# Patient Record
Sex: MALE | Race: BLACK OR AFRICAN AMERICAN | NOT HISPANIC OR LATINO | Employment: UNEMPLOYED | URBAN - METROPOLITAN AREA
[De-identification: names, ages, dates, MRNs, and addresses within clinical notes are randomized per-mention and may not be internally consistent; named-entity substitution may affect disease eponyms.]

---

## 2017-06-19 ENCOUNTER — OFFICE VISIT (OUTPATIENT)
Dept: INTERNAL MEDICINE | Facility: CLINIC | Age: 57
End: 2017-06-19
Attending: FAMILY MEDICINE
Payer: MEDICAID

## 2017-06-19 VITALS
WEIGHT: 191.81 LBS | BODY MASS INDEX: 31.96 KG/M2 | HEIGHT: 65 IN | DIASTOLIC BLOOD PRESSURE: 82 MMHG | SYSTOLIC BLOOD PRESSURE: 126 MMHG | HEART RATE: 76 BPM | OXYGEN SATURATION: 96 % | TEMPERATURE: 98 F

## 2017-06-19 DIAGNOSIS — C67.9 MALIGNANT NEOPLASM OF URINARY BLADDER, UNSPECIFIED SITE: ICD-10-CM

## 2017-06-19 DIAGNOSIS — Z00.00 ROUTINE GENERAL MEDICAL EXAMINATION AT A HEALTH CARE FACILITY: Primary | ICD-10-CM

## 2017-06-19 PROCEDURE — 99999 PR PBB SHADOW E&M-NEW PATIENT-LVL III: CPT | Mod: PBBFAC,,, | Performed by: INTERNAL MEDICINE

## 2017-06-19 PROCEDURE — 99386 PREV VISIT NEW AGE 40-64: CPT | Mod: S$PBB,,, | Performed by: INTERNAL MEDICINE

## 2017-06-19 PROCEDURE — 99203 OFFICE O/P NEW LOW 30 MIN: CPT | Mod: PBBFAC,PO | Performed by: INTERNAL MEDICINE

## 2017-06-19 NOTE — PROGRESS NOTES
Subjective:       Patient ID: Nolan Villalobos is a 56 y.o. male.    Chief Complaint: Establish Care    Here for checkup.    Has not had routine care.          Review of Systems   Constitutional: Negative for appetite change and unexpected weight change.   Respiratory: Negative for chest tightness and shortness of breath.    Cardiovascular: Negative for chest pain.   Gastrointestinal: Negative for abdominal pain.   Genitourinary: Negative for difficulty urinating, hematuria, scrotal swelling and testicular pain.   Skin:        No lesions       Objective:      Physical Exam   Constitutional: He is oriented to person, place, and time. He appears well-developed and well-nourished. No distress.   HENT:   Head: Normocephalic and atraumatic.   Eyes: No scleral icterus.   Neck: Normal range of motion. No thyromegaly present.   Cardiovascular: Normal rate, regular rhythm and normal heart sounds.  Exam reveals no gallop and no friction rub.    No murmur heard.  Pulmonary/Chest: Effort normal and breath sounds normal. No respiratory distress. He has no wheezes. He has no rales.   Abdominal: Soft. Bowel sounds are normal. He exhibits no distension and no mass. There is no tenderness. There is no rebound and no guarding.   Musculoskeletal: Normal range of motion. He exhibits no edema.   Lymphadenopathy:     He has no cervical adenopathy.   Neurological: He is alert and oriented to person, place, and time.   Skin: No lesion noted.   Psychiatric: He has a normal mood and affect. Thought content normal.       Assessment:       1. Routine general medical examination at a health care facility    2. Malignant neoplasm of urinary bladder, unspecified site        Plan:       Nolan was seen today for establish care.    Diagnoses and all orders for this visit:    Routine general medical examination at a health care facility  -     CBC auto differential; Future  -     Comprehensive metabolic panel; Future  -     Hepatitis C antibody; Future  -      Lipid panel; Future  -     Hemoglobin A1c; Future  -     PSA, Screening; Future  -     Ambulatory Referral to [facility] (MED[facility])  -     [facility] ASSIGN QUESTIONNAIRE SERIES ([facility])  -     [facility] Patient Entered [facility] ([facility])    Malignant neoplasm of urinary bladder, unspecified site  -     CBC auto differential; Future  -     Comprehensive metabolic panel; Future  -     Hepatitis C antibody; Future  -     Lipid panel; Future  -     Hemoglobin A1c; Future  -     PSA, Screening; Future  -     Ambulatory Referral to [facility] ([facility])  -     [facility] [employer] ASSIGN QUESTIONNAIRE SERIES ([facility])  -     [facility] Patient Entered [facility] ([facility])    utd colo had in 2012 at ? [facility]    Return in about 1 year (around 6/19/2018).

## 2017-06-26 ENCOUNTER — LAB VISIT (OUTPATIENT)
Dept: LAB | Facility: HOSPITAL | Age: 57
End: 2017-06-26
Attending: INTERNAL MEDICINE
Payer: MEDICAID

## 2017-06-26 DIAGNOSIS — C67.9 MALIGNANT NEOPLASM OF URINARY BLADDER, UNSPECIFIED SITE: ICD-10-CM

## 2017-06-26 DIAGNOSIS — Z00.00 ROUTINE GENERAL MEDICAL EXAMINATION AT A HEALTH CARE FACILITY: ICD-10-CM

## 2017-06-26 LAB
ALBUMIN SERPL BCP-MCNC: 3.6 G/DL
ALP SERPL-CCNC: 63 U/L
ALT SERPL W/O P-5'-P-CCNC: 13 U/L
ANION GAP SERPL CALC-SCNC: 7 MMOL/L
AST SERPL-CCNC: 17 U/L
BASOPHILS # BLD AUTO: 0.04 K/UL
BASOPHILS NFR BLD: 0.6 %
BILIRUB SERPL-MCNC: 0.3 MG/DL
BUN SERPL-MCNC: 10 MG/DL
CALCIUM SERPL-MCNC: 9.2 MG/DL
CHLORIDE SERPL-SCNC: 104 MMOL/L
CHOLEST/HDLC SERPL: 4.5 {RATIO}
CO2 SERPL-SCNC: 29 MMOL/L
COMPLEXED PSA SERPL-MCNC: 0.86 NG/ML
CREAT SERPL-MCNC: 0.8 MG/DL
DIFFERENTIAL METHOD: ABNORMAL
EOSINOPHIL # BLD AUTO: 0.2 K/UL
EOSINOPHIL NFR BLD: 2.6 %
ERYTHROCYTE [DISTWIDTH] IN BLOOD BY AUTOMATED COUNT: 14.1 %
EST. GFR  (AFRICAN AMERICAN): >60 ML/MIN/1.73 M^2
EST. GFR  (NON AFRICAN AMERICAN): >60 ML/MIN/1.73 M^2
ESTIMATED AVG GLUCOSE: 100 MG/DL
GLUCOSE SERPL-MCNC: 92 MG/DL
HBA1C MFR BLD HPLC: 5.1 %
HCT VFR BLD AUTO: 43.4 %
HCV AB SERPL QL IA: NEGATIVE
HDL/CHOLESTEROL RATIO: 22.1 %
HDLC SERPL-MCNC: 204 MG/DL
HDLC SERPL-MCNC: 45 MG/DL
HGB BLD-MCNC: 14.3 G/DL
LDLC SERPL CALC-MCNC: 133.2 MG/DL
LYMPHOCYTES # BLD AUTO: 2 K/UL
LYMPHOCYTES NFR BLD: 29.8 %
MCH RBC QN AUTO: 26.5 PG
MCHC RBC AUTO-ENTMCNC: 32.9 %
MCV RBC AUTO: 81 FL
MONOCYTES # BLD AUTO: 0.8 K/UL
MONOCYTES NFR BLD: 12.7 %
NEUTROPHILS # BLD AUTO: 3.5 K/UL
NEUTROPHILS NFR BLD: 54 %
NONHDLC SERPL-MCNC: 159 MG/DL
PLATELET # BLD AUTO: 268 K/UL
PMV BLD AUTO: 10.9 FL
POTASSIUM SERPL-SCNC: 4 MMOL/L
PROT SERPL-MCNC: 7.5 G/DL
RBC # BLD AUTO: 5.39 M/UL
SODIUM SERPL-SCNC: 140 MMOL/L
TRIGL SERPL-MCNC: 129 MG/DL
WBC # BLD AUTO: 6.55 K/UL

## 2017-06-26 PROCEDURE — 85025 COMPLETE CBC W/AUTO DIFF WBC: CPT

## 2017-06-26 PROCEDURE — 83036 HEMOGLOBIN GLYCOSYLATED A1C: CPT

## 2017-06-26 PROCEDURE — 86803 HEPATITIS C AB TEST: CPT

## 2017-06-26 PROCEDURE — 84153 ASSAY OF PSA TOTAL: CPT

## 2017-06-26 PROCEDURE — 36415 COLL VENOUS BLD VENIPUNCTURE: CPT

## 2017-06-26 PROCEDURE — 80053 COMPREHEN METABOLIC PANEL: CPT

## 2017-06-26 PROCEDURE — 80061 LIPID PANEL: CPT

## 2018-01-16 ENCOUNTER — OFFICE VISIT (OUTPATIENT)
Dept: URGENT CARE | Facility: CLINIC | Age: 58
End: 2018-01-16
Payer: MEDICAID

## 2018-01-16 VITALS
SYSTOLIC BLOOD PRESSURE: 151 MMHG | OXYGEN SATURATION: 96 % | TEMPERATURE: 99 F | HEIGHT: 66 IN | HEART RATE: 88 BPM | BODY MASS INDEX: 28.93 KG/M2 | DIASTOLIC BLOOD PRESSURE: 104 MMHG | WEIGHT: 180 LBS | RESPIRATION RATE: 18 BRPM

## 2018-01-16 DIAGNOSIS — R05.9 COUGH: ICD-10-CM

## 2018-01-16 DIAGNOSIS — J01.90 ACUTE SINUSITIS, RECURRENCE NOT SPECIFIED, UNSPECIFIED LOCATION: Primary | ICD-10-CM

## 2018-01-16 LAB
CTP QC/QA: YES
CTP QC/QA: YES
FLUAV AG NPH QL: NEGATIVE
FLUBV AG NPH QL: NEGATIVE
S PYO RRNA THROAT QL PROBE: NEGATIVE

## 2018-01-16 PROCEDURE — 87804 INFLUENZA ASSAY W/OPTIC: CPT | Mod: QW,S$GLB,, | Performed by: NURSE PRACTITIONER

## 2018-01-16 PROCEDURE — 99213 OFFICE O/P EST LOW 20 MIN: CPT | Mod: S$GLB,,, | Performed by: NURSE PRACTITIONER

## 2018-01-16 PROCEDURE — 87880 STREP A ASSAY W/OPTIC: CPT | Mod: QW,S$GLB,, | Performed by: NURSE PRACTITIONER

## 2018-01-16 RX ORDER — AMOXICILLIN 500 MG/1
500 CAPSULE ORAL EVERY 12 HOURS
Qty: 20 CAPSULE | Refills: 0 | Status: SHIPPED | OUTPATIENT
Start: 2018-01-16 | End: 2018-01-26

## 2018-01-16 RX ORDER — BETAMETHASONE SODIUM PHOSPHATE AND BETAMETHASONE ACETATE 3; 3 MG/ML; MG/ML
9 INJECTION, SUSPENSION INTRA-ARTICULAR; INTRALESIONAL; INTRAMUSCULAR; SOFT TISSUE
Status: COMPLETED | OUTPATIENT
Start: 2018-01-16 | End: 2018-01-16

## 2018-01-16 RX ADMIN — BETAMETHASONE SODIUM PHOSPHATE AND BETAMETHASONE ACETATE 9 MG: 3; 3 INJECTION, SUSPENSION INTRA-ARTICULAR; INTRALESIONAL; INTRAMUSCULAR; SOFT TISSUE at 12:01

## 2018-01-16 NOTE — PROGRESS NOTES
"Subjective:       Patient ID: Nolan Villalobos is a 57 y.o. male.    Vitals:  height is 5' 6" (1.676 m) and weight is 81.6 kg (180 lb). His tympanic temperature is 98.8 °F (37.1 °C). His blood pressure is 151/104 (abnormal) and his pulse is 88. His respiration is 18 and oxygen saturation is 96%.     Chief Complaint: Cough (2 days )    This is a 57 y.o. male with Past Medical History:  No date: Bladder cancer   who presents today with a chief complaint of cough for 3-7 days.       Cough   This is a new problem. The current episode started in the past 7 days (3 days to 1 week ). The problem has been gradually worsening. The problem occurs constantly. The cough is productive of sputum. Associated symptoms include chills, eye redness (right ), headaches, myalgias, nasal congestion, rhinorrhea and a sore throat. Pertinent negatives include no chest pain, ear pain, fever, shortness of breath or wheezing. The symptoms are aggravated by lying down. Treatments tried: mucinex theraflu nyquil  The treatment provided mild relief.     Review of Systems   Constitution: Positive for chills. Negative for fever and malaise/fatigue.   HENT: Positive for congestion, hoarse voice, rhinorrhea and sore throat. Negative for ear pain.    Eyes: Positive for redness (right ). Negative for discharge. Eye pain: right    Cardiovascular: Negative for chest pain, dyspnea on exertion and leg swelling.   Respiratory: Positive for cough and sputum production. Negative for shortness of breath and wheezing.    Musculoskeletal: Positive for myalgias.   Gastrointestinal: Negative for abdominal pain and nausea.   Neurological: Positive for headaches.       Objective:      Physical Exam   Constitutional: He is oriented to person, place, and time. He appears well-developed and well-nourished.   HENT:   Head: Normocephalic and atraumatic.   Right Ear: Hearing, tympanic membrane, external ear and ear canal normal. Tympanic membrane is not erythematous.   Left Ear: " Hearing, tympanic membrane, external ear and ear canal normal. Tympanic membrane is not erythematous.   Nose: No mucosal edema or rhinorrhea. Right sinus exhibits frontal sinus tenderness. Right sinus exhibits no maxillary sinus tenderness. Left sinus exhibits frontal sinus tenderness. Left sinus exhibits no maxillary sinus tenderness.   Mouth/Throat: Uvula is midline and mucous membranes are normal. Posterior oropharyngeal erythema (mild) present. No posterior oropharyngeal edema.   Purulent PND   Eyes: EOM and lids are normal. Lids are everted and swept, no foreign bodies found. Right conjunctiva is injected. Left conjunctiva is not injected.   Neck: Normal range of motion and full passive range of motion without pain. Neck supple.   Cardiovascular: Normal rate, regular rhythm, S1 normal, S2 normal, normal heart sounds and normal pulses.    Pulmonary/Chest: Effort normal and breath sounds normal. No respiratory distress. He has no decreased breath sounds. He has no wheezes.   Neurological: He is alert and oriented to person, place, and time.   Skin: Skin is warm and dry.   Nursing note and vitals reviewed.      Assessment:       1. Acute sinusitis, recurrence not specified, unspecified location    2. Cough        Plan:         Acute sinusitis, recurrence not specified, unspecified location    Cough  -     POCT Influenza A/B  -     POCT rapid strep A    Other orders  -     betamethasone acetate-betamethasone sodium phosphate injection 9 mg; Inject 1.5 mLs (9 mg total) into the muscle one time.  -     amoxicillin (AMOXIL) 500 MG capsule; Take 1 capsule (500 mg total) by mouth every 12 (twelve) hours.  Dispense: 20 capsule; Refill: 0

## 2018-01-16 NOTE — PATIENT INSTRUCTIONS
Take over the counter Claritin, Allegra, or Zyrtec for relief of symptoms.   Take over the counter Flonase for relief of symptoms.  These medications can be purchased at any local drug store or pharmacy.    Please arrange follow up with your primary medical clinic as soon as possible. You must understand that you've received an Urgent Care treatment only and that you may be released before all of your medical problems are known or treated. You, the patient, will arrange for follow up as instructed. If your symptoms worsen or fail to improve you should go to the Emergency Room.      Sinusitis (Antibiotic Treatment)    The sinuses are air-filled spaces within the bones of the face. They connect to the inside of the nose. Sinusitis is an inflammation of the tissue lining the sinus cavity. Sinus inflammation can occur during a cold. It can also be due to allergies to pollens and other particles in the air. Sinusitis can cause symptoms of sinus congestion and fullness. A sinus infection causes fever, headache and facial pain. There is often green or yellow drainage from the nose or into the back of the throat (post-nasal drip). You have been given antibiotics to treat this condition.  Home care:  · Take the full course of antibiotics as instructed. Do not stop taking them, even if you feel better.  · Drink plenty of water, hot tea, and other liquids. This may help thin mucus. It also may promote sinus drainage.  · Heat may help soothe painful areas of the face. Use a towel soaked in hot water. Or,  the shower and direct the hot spray onto your face. Using a vaporizer along with a menthol rub at night may also help.   · An expectorant containing guaifenesin may help thin the mucus and promote drainage from the sinuses.  · Over-the-counter decongestants may be used unless a similar medicine was prescribed. Nasal sprays work the fastest. Use one that contains phenylephrine or oxymetazoline. First blow the nose  gently. Then use the spray. Do not use these medicines more often than directed on the label or symptoms may get worse. You may also use tablets containing pseudoephedrine. Avoid products that combine ingredients, because side effects may be increased. Read labels. You can also ask the pharmacist for help. (NOTE: Persons with high blood pressure should not use decongestants. They can raise blood pressure.)  · Over-the-counter antihistamines may help if allergies contributed to your sinusitis.    · Do not use nasal rinses or irrigation during an acute sinus infection, unless told to by your health care provider. Rinsing may spread the infection to other sinuses.  · Use acetaminophen or ibuprofen to control pain, unless another pain medicine was prescribed. (If you have chronic liver or kidney disease or ever had a stomach ulcer, talk with your doctor before using these medicines. Aspirin should never be used in anyone under 18 years of age who is ill with a fever. It may cause severe liver damage.)  · Don't smoke. This can worsen symptoms.  Follow-up care  Follow up with your healthcare provider or our staff if you are not improving within the next week.  When to seek medical advice  Call your healthcare provider if any of these occur:  · Facial pain or headache becoming more severe  · Stiff neck  · Unusual drowsiness or confusion  · Swelling of the forehead or eyelids  · Vision problems, including blurred or double vision  · Fever of 100.4ºF (38ºC) or higher, or as directed by your healthcare provider  · Seizure  · Breathing problems  · Symptoms not resolving within 10 days  Date Last Reviewed: 4/13/2015  © 0239-1019 [employer]. [address] [place] [address], [place] [address]. All rights reserved. This information is not intended as a substitute for professional medical care. Always follow your healthcare professional's instructions.

## 2018-05-11 DIAGNOSIS — Z12.11 COLON CANCER SCREENING: ICD-10-CM

## 2018-05-18 DIAGNOSIS — Z12.11 COLON CANCER SCREENING: ICD-10-CM

## 2019-10-15 ENCOUNTER — PATIENT MESSAGE (OUTPATIENT)
Dept: INTERNAL MEDICINE | Facility: CLINIC | Age: 59
End: 2019-10-15

## 2019-10-15 ENCOUNTER — OFFICE VISIT (OUTPATIENT)
Dept: INTERNAL MEDICINE | Facility: CLINIC | Age: 59
End: 2019-10-15
Payer: MEDICAID

## 2019-10-15 VITALS
DIASTOLIC BLOOD PRESSURE: 80 MMHG | SYSTOLIC BLOOD PRESSURE: 140 MMHG | HEIGHT: 66 IN | HEART RATE: 72 BPM | WEIGHT: 180.31 LBS | BODY MASS INDEX: 28.98 KG/M2 | OXYGEN SATURATION: 97 %

## 2019-10-15 DIAGNOSIS — M25.552 LEFT HIP PAIN: ICD-10-CM

## 2019-10-15 DIAGNOSIS — Z00.00 HEALTHCARE MAINTENANCE: Primary | ICD-10-CM

## 2019-10-15 PROCEDURE — 99213 OFFICE O/P EST LOW 20 MIN: CPT | Mod: S$PBB,,, | Performed by: STUDENT IN AN ORGANIZED HEALTH CARE EDUCATION/TRAINING PROGRAM

## 2019-10-15 PROCEDURE — 99999 PR PBB SHADOW E&M-EST. PATIENT-LVL IV: ICD-10-PCS | Mod: PBBFAC,,, | Performed by: STUDENT IN AN ORGANIZED HEALTH CARE EDUCATION/TRAINING PROGRAM

## 2019-10-15 PROCEDURE — 99214 OFFICE O/P EST MOD 30 MIN: CPT | Mod: PBBFAC | Performed by: STUDENT IN AN ORGANIZED HEALTH CARE EDUCATION/TRAINING PROGRAM

## 2019-10-15 PROCEDURE — 99999 PR PBB SHADOW E&M-EST. PATIENT-LVL IV: CPT | Mod: PBBFAC,,, | Performed by: STUDENT IN AN ORGANIZED HEALTH CARE EDUCATION/TRAINING PROGRAM

## 2019-10-15 PROCEDURE — 99213 PR OFFICE/OUTPT VISIT, EST, LEVL III, 20-29 MIN: ICD-10-PCS | Mod: S$PBB,,, | Performed by: STUDENT IN AN ORGANIZED HEALTH CARE EDUCATION/TRAINING PROGRAM

## 2019-10-15 NOTE — PATIENT INSTRUCTIONS
- Follow-up in 2-4 weeks about hip pain, if it gets worse please go to Emergency Department  - 2 week course of ibuprofen 200 mg twice a day with food  - Stretching exercises  - Lab work today    Back Exercises: Hip Lift    To start, lie on your back with your knees bent and feet flat on the floor. Dont press your neck or lower back to the floor. Breathe deeply. You should feel comfortable and relaxed in this position:  · Tighten your abdomen and buttocks.  · Slowly raise your hips upward. Be careful not to arch your back.  · Hold for 5 seconds. Lower your hips to the floor.  · Repeat 10 times.  For your safety, check with your healthcare provider before starting an exercise program.   Date Last Reviewed: 8/16/2015 © 2000-2017 [employer]. [address] [place] [address]. All rights reserved. This information is not intended as a substitute for professional medical care. Always follow your healthcare professional's instructions.        Back Exercises: Hip Lift    To start, lie on your back with your knees bent and feet flat on the floor. Dont press your neck or lower back to the floor. Breathe deeply. You should feel comfortable and relaxed in this position:  · Tighten your abdomen and buttocks.  · Slowly raise your hips upward. Be careful not to arch your back.  · Hold for 5 seconds. Lower your hips to the floor.  · Repeat 10 times.  For your safety, check with your healthcare provider before starting an exercise program.   Date Last Reviewed: 8/16/2015  © 4337-3816 [employer]. [address] [place] [address]. All rights reserved. This information is not intended as a substitute for professional medical care. Always follow your healthcare professional's instructions.

## 2019-10-15 NOTE — PROGRESS NOTES
Subjective:       Patient ID: Nolan Villalobos is a 59 y.o. male.    Chief Complaint: Annual Exam    HPI     Nolan Villalobos is 60 yo m w/ PMHx of Bladder Cancer s/p tumor resection and bCG tx in 2012 w/ no radiation or chemo. Presents for annual exam.    Patient has complaint of Left sided hip pain, which started about 2 days ago after a 30 hour bus ride returning returning from [place]. He states that the bus trip to [place] was on the 8th and he returned to [place] on the 12th. Bus ride was 30 hours each way of which he sat most of the time and slept. Patient did report standing up at stops to walk around. Patient denies any leg swelling/SOB/cp.    Patient describes the left hip pain as sharp and throbbing in nature with no radiation. Pain is aggravated when presses hip bone, but not aggravated by movement or position. Pain is alleviated with motrin/tylenol, helped with heating pad. No f/c/ ns/cp /sob/ numbness/tingling /weakness. Feels like walking differently, but it has been getting better. Patient denies fall or trauma.       In regards to health maintenance, patient states he eats a healthy home-cooked diet and has lost a few lbs since cutting out fast foods. He does not exercise as his work is mainly sedentary and he is self-employed. Patient is a non-smoker and drinks infrequently, 1 beer every few months. Patient follows with cancer doctor at [facility], but he states he will bring all documentation to his next appointment and call with updated vaccination and [facility] records.    Otherwise patient has no complaints at present.    Patient will call to provided documentation of patient's recent colonoscopy and outside labs.    Review of Systems   Constitutional: Negative for activity change and unexpected weight change.   HENT: Negative for hearing loss, rhinorrhea and trouble swallowing.    Eyes: Negative for discharge and visual disturbance.   Respiratory: Negative for chest tightness and wheezing.    Cardiovascular:  Negative for chest pain and palpitations.   Gastrointestinal: Negative for blood in stool, constipation, diarrhea and vomiting.   Endocrine: Negative for polydipsia and polyuria.   Genitourinary: Negative for difficulty urinating, hematuria and urgency.   Musculoskeletal: Positive for arthralgias. Negative for joint swelling and neck pain.   Neurological: Positive for headaches. Negative for weakness.   Psychiatric/Behavioral: Negative for confusion and dysphoric mood.             ----------------------  Health Maintanence  Past Medical History:   Diagnosis Date    Bladder cancer      No current outpatient medications on file.  Review of patient's allergies indicates:  No Known Allergies  Past Surgical History:   Procedure Laterality Date    BLADDER SURGERY  2012    BLADDER TUMOR EXCISION       Family History   Problem Relation Age of Onset    Hypertension Mother     Cancer Mother         CRC    Hypertension Father     Cancer Brother         panc ca     Social History     Socioeconomic History    Marital status:      Spouse name: Not on file    Number of children: Not on file    Years of education: Not on file    Highest education level: Not on file   Occupational History    Not on file   Social Needs    Financial resource strain: Not very hard    Food insecurity:     Worry: Never true     Inability: Never true    Transportation needs:     Medical: No     Non-medical: No   Tobacco Use    Smoking status: Never Smoker   Substance and Sexual Activity    Alcohol use: Yes     Alcohol/week: 1.0 standard drinks     Types: 1 Cans of beer per week     Frequency: Never     Binge frequency: Never     Comment: occ    Drug use: Not on file    Sexual activity: Yes     Partners: Female   Lifestyle    Physical activity:     Days per week: 3 days     Minutes per session: 30 min    Stress: Only a little   Relationships    Social connections:     Talks on phone: More than three times a week     Gets  together: More than three times a week     Attends [employer] service: Not on file     Active member of club or organization: No     Attends meetings of clubs or organizations: Never     Relationship status:    Other Topics Concern    Not on file   Social History Narrative    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 , 2 kids, grown. Min exercise.       Objective:      Physical Exam   Constitutional: He is oriented to person, place, and time. He appears well-developed and well-nourished. No distress.   HENT:   Head: Normocephalic and atraumatic.   Eyes: Pupils are equal, round, and reactive to light. EOM are normal. No scleral icterus.   Neck: Normal range of motion. Neck supple. No JVD present. No thyromegaly present.   Cardiovascular: Normal rate, regular rhythm, normal heart sounds and intact distal pulses.   No murmur heard.  Pulmonary/Chest: Effort normal and breath sounds normal. No respiratory distress.   Abdominal: Soft. Bowel sounds are normal. He exhibits no distension and no mass. There is no tenderness. There is no rebound and no guarding.   Musculoskeletal: Normal range of motion. He exhibits no edema or tenderness.        Left hip: He exhibits bony tenderness. He exhibits normal range of motion, normal strength, no tenderness, no swelling, no crepitus, no deformity and no laceration.   Negative Les's sign    L.hip: Pain on external rotation   Lymphadenopathy:     He has no cervical adenopathy.   Neurological: He is alert and oriented to person, place, and time. No cranial nerve deficit.   Skin: Skin is warm and dry. Capillary refill takes less than 2 seconds. He is not diaphoretic. No erythema.   Psychiatric: He has a normal mood and affect. His behavior is normal. Judgment and thought content normal.             Assessment & Plan:     Nolan was seen today for annual exam    .  Diagnoses and all orders for this visit:    Healthcare maintenance  -     CBC auto differential; Future  -     Comprehensive  metabolic panel; Future  -     Lipid panel; Future  -     Hemoglobin A1c; Future        - Patient states that he will get the following at his local pharmacy and return  documentation :    Tetanus Shot    Flu shot    Pneumococcal vaccination    Shingles vaccination    Left hip pain   - Based on hx likely inflammatory in nature   - Patient will take Ibuprofen 200mg BID for 2 weeks   - Provided information for stretching exercises   - Patient to f/u if worsening of hip pain;    instructed to go to ED if worsening of pain, f/c/ ns/cp/ sob/ eg swelling or  any other concerning sx    Other:  - Patient to update me in 2-4 weeks about hip pain status and additionally update on colonoscopy documentation  - Patient's wife will send patient's records from [facility]  - Recheck BP at next visit, patient advised to keep BP log at home and call in 2 weeks about BP measurements   - Patient seen in consultation with

## 2019-10-16 NOTE — PROGRESS NOTES
I have reviewed and concur with the resident's history, physical, assessment, and plan.  I have personally interviewed and examined the patient    at bedside.  See below addendum for my evaluation and additional findings.  Pt. Seen for health maintenance and evaluation of hip pain.  Pts. Pain likely mechanical in nature and will treat conservatively initially and pt is to follow with imaging if no improvement in 2-4 weeks. Pt. Will elevated BP today without diagnosis of hypertension maybe related to pain from hip.  Will monitor and continue to encourage lifestyle modifications and DASH diet.

## 2019-10-17 ENCOUNTER — TELEPHONE (OUTPATIENT)
Dept: INTERNAL MEDICINE | Facility: CLINIC | Age: 59
End: 2019-10-17

## 2019-10-17 NOTE — TELEPHONE ENCOUNTER
Spoke with patient's wife, she said that her and her  saw all the test results. All were WNL. They will call back for any additional questions or concerns.

## 2019-11-06 ENCOUNTER — CLINICAL SUPPORT (OUTPATIENT)
Dept: INTERNAL MEDICINE | Facility: CLINIC | Age: 59
End: 2019-11-06
Payer: MEDICAID

## 2019-11-06 PROCEDURE — 90472 IMMUNIZATION ADMIN EACH ADD: CPT | Mod: PBBFAC

## 2019-11-06 PROCEDURE — 90471 IMMUNIZATION ADMIN: CPT | Mod: PBBFAC

## 2019-11-23 ENCOUNTER — PATIENT MESSAGE (OUTPATIENT)
Dept: INTERNAL MEDICINE | Facility: CLINIC | Age: 59
End: 2019-11-23

## 2020-05-15 ENCOUNTER — TELEPHONE (OUTPATIENT)
Dept: INTERNAL MEDICINE | Facility: CLINIC | Age: 60
End: 2020-05-15

## 2020-05-15 NOTE — TELEPHONE ENCOUNTER
----- Message from Kathy Hendricks sent at 5/13/2020 12:50 PM CDT -----  Contact: self/[phone]  Cc: Darby Vera   Patient called in regards needing an appointment with Dr Darby Vera. Unable to find an open appointment with her. Patient stated that they like to f/u with the same doctor. Please call and advise. Thank you

## 2020-09-16 ENCOUNTER — TELEPHONE (OUTPATIENT)
Dept: INTERNAL MEDICINE | Facility: CLINIC | Age: 60
End: 2020-09-16

## 2020-09-16 NOTE — TELEPHONE ENCOUNTER
----- Message from Rosa M Malhotra sent at 9/16/2020  4:50 PM CDT -----  Regarding: Labs  Contact: Patient @ [phone]  Doctor appointment and lab have been scheduled.  Please link lab orders to the lab appointment.  Date of doctor appointment:  10/06/2020  Date of lab appointment:  09/29/2020  Physical or EP:   Comments:

## 2020-10-06 ENCOUNTER — HOSPITAL ENCOUNTER (OUTPATIENT)
Dept: RADIOLOGY | Facility: HOSPITAL | Age: 60
Discharge: HOME | End: 2020-10-06
Attending: STUDENT IN AN ORGANIZED HEALTH CARE EDUCATION/TRAINING PROGRAM
Payer: MEDICAID

## 2020-10-06 ENCOUNTER — OFFICE VISIT (OUTPATIENT)
Dept: INTERNAL MEDICINE | Facility: CLINIC | Age: 60
End: 2020-10-06
Payer: MEDICAID

## 2020-10-06 VITALS
BODY MASS INDEX: 29.83 KG/M2 | OXYGEN SATURATION: 96 % | SYSTOLIC BLOOD PRESSURE: 160 MMHG | DIASTOLIC BLOOD PRESSURE: 90 MMHG | WEIGHT: 185.63 LBS | HEIGHT: 66 IN | HEART RATE: 60 BPM

## 2020-10-06 DIAGNOSIS — I10 ESSENTIAL HYPERTENSION: ICD-10-CM

## 2020-10-06 DIAGNOSIS — M25.561 CHRONIC PAIN OF RIGHT KNEE: Primary | ICD-10-CM

## 2020-10-06 DIAGNOSIS — G89.29 CHRONIC PAIN OF RIGHT KNEE: Primary | ICD-10-CM

## 2020-10-06 DIAGNOSIS — G89.29 CHRONIC PAIN OF RIGHT KNEE: ICD-10-CM

## 2020-10-06 DIAGNOSIS — C67.9 MALIGNANT NEOPLASM OF URINARY BLADDER, UNSPECIFIED SITE: ICD-10-CM

## 2020-10-06 DIAGNOSIS — M25.561 CHRONIC PAIN OF RIGHT KNEE: ICD-10-CM

## 2020-10-06 DIAGNOSIS — Z00.00 HEALTH MAINTENANCE EXAMINATION: ICD-10-CM

## 2020-10-06 PROCEDURE — 99999 PR PBB SHADOW E&M-EST. PATIENT-LVL V: ICD-10-PCS | Mod: PBBFAC,,, | Performed by: STUDENT IN AN ORGANIZED HEALTH CARE EDUCATION/TRAINING PROGRAM

## 2020-10-06 PROCEDURE — 73562 XR KNEE 3 VIEW RIGHT: ICD-10-PCS | Mod: 26,RT,, | Performed by: RADIOLOGY

## 2020-10-06 PROCEDURE — 99215 OFFICE O/P EST HI 40 MIN: CPT | Mod: PBBFAC,25 | Performed by: STUDENT IN AN ORGANIZED HEALTH CARE EDUCATION/TRAINING PROGRAM

## 2020-10-06 PROCEDURE — 73562 X-RAY EXAM OF KNEE 3: CPT | Mod: 26,RT,, | Performed by: RADIOLOGY

## 2020-10-06 PROCEDURE — 99214 PR OFFICE/OUTPT VISIT, EST, LEVL IV, 30-39 MIN: ICD-10-PCS | Mod: S$PBB,,, | Performed by: INTERNAL MEDICINE

## 2020-10-06 PROCEDURE — 73562 X-RAY EXAM OF KNEE 3: CPT | Mod: TC,RT

## 2020-10-06 PROCEDURE — 99999 PR PBB SHADOW E&M-EST. PATIENT-LVL V: CPT | Mod: PBBFAC,,, | Performed by: STUDENT IN AN ORGANIZED HEALTH CARE EDUCATION/TRAINING PROGRAM

## 2020-10-06 PROCEDURE — 99214 OFFICE O/P EST MOD 30 MIN: CPT | Mod: S$PBB,,, | Performed by: INTERNAL MEDICINE

## 2020-10-06 RX ORDER — DICLOFENAC SODIUM 10 MG/G
2 GEL TOPICAL 2 TIMES DAILY PRN
Qty: 20 G | Refills: 0 | Status: SHIPPED | OUTPATIENT
Start: 2020-10-06 | End: 2020-10-23 | Stop reason: SDUPTHER

## 2020-10-06 RX ORDER — HYDROCHLOROTHIAZIDE 12.5 MG/1
12.5 TABLET ORAL DAILY
Qty: 30 TABLET | Refills: 11 | Status: SHIPPED | OUTPATIENT
Start: 2020-10-06 | End: 2021-09-29 | Stop reason: SDUPTHER

## 2020-10-06 NOTE — PROGRESS NOTES
Clinic Note  10/6/2020      Subjective:       Patient ID:  Nolan is a 60 y.o. male being seen for an [facility] care visit.    Chief Complaint: Annual Exam and Knee Pain (both knees)    HPI   Nolan Villalobos is 58 yo m w/ PMHx of Bladder Cancer s/p tumor resection and bCG tx in 2012 w/ no radiation or chemo who presents for annual exam.    Bladder Cancer follow-up  Patient following with [facility] for urology. Annual PSA and cystoscopy. Patient recently saw Dr. Pemberton with Urology who has been following patient.    Chronic Right knee pain: Patient reports that he has had throbbing pain in the right knee. States that it is  worse when standing after sitting. Feels like it gets stiff during the day. States that it takes a minute to get better to improve after walking. No trauma. No swelling. Patient tried icy hot and motrin which helped with the pain. Patient denies any joint swelling, warmth, trauma to the area.       HTN:  Patient with elevated BP logs at home. Patient's BP on repeat SBPs in clinic 160s. Patient has not been on a hypertensive regimen in the past.         Colorectal Cancer Screening-- May 22nd 2013. Patient's wife said that colonoscopy was normal; however, is unsure. Did state that patient was told he needs a repeat Colonoscopy in 10 years.   Crow Gauthier [phone], GI who performed the colonoscopy.    Pneumococcal vaccine recommended to patient in addition to shingles vaccination. Patient and wife state that he is amenable to vaccination at a later time. Patient provided vaccination reminder card.    Flu shot discussed recommendation for the flu shot. Patient is not interested at this time.    Review of Systems   Constitutional: Negative for chills, fever, malaise/fatigue and weight loss.   Respiratory: Negative for cough and shortness of breath.    Cardiovascular: Negative for chest pain, palpitations and leg swelling.   Gastrointestinal: Negative for constipation, melena, nausea and vomiting.  "  Genitourinary: Negative for dysuria and urgency.   Musculoskeletal: Negative for back pain and joint pain.   Skin: Negative for rash.   Neurological: Negative for dizziness, weakness and headaches.   Psychiatric/Behavioral: Negative for suicidal ideas.       Medication List with Changes/Refills   New Medications    DICLOFENAC SODIUM (VOLTAREN) 1 % GEL    Apply 2 g topically 2 (two) times daily as needed.    HYDROCHLOROTHIAZIDE (HYDRODIURIL) 12.5 MG TAB    Take 1 tablet (12.5 mg total) by mouth once daily.   Current Medications    ASCORBIC ACID, VITAMIN C, (VITAMIN C) 100 MG TABLET    Take 1,000 mg by mouth once daily.    DIPHTH,PERTUS,ACELL,,TETANUS (BOOSTRIX TDAP) 2.5-8-5 LF-MCG-LF/0.5ML SUSP    Inject 0.5 mLs into the muscle.    MULTIVITAMIN CAPSULE    Take 1 capsule by mouth once daily.       Patient Active Problem List   Diagnosis    Bladder cancer    Left hip pain    Chronic pain of right knee    Essential hypertension    Health maintenance examination           Objective:      BP (!) 160/90 (BP Location: Right arm, Patient Position: Sitting, BP Method: Large (Manual))   Pulse 60   Ht 5' 6" (1.676 m)   Wt 84.2 kg (185 lb 10 oz)   SpO2 96%   BMI 29.96 kg/m²   Estimated body mass index is 29.96 kg/m² as calculated from the following:    Height as of this encounter: 5' 6" (1.676 m).    Weight as of this encounter: 84.2 kg (185 lb 10 oz).     Physical Exam   Constitutional: He is oriented to person, place, and time and well-developed, well-nourished, and in no distress. No distress.   Obese   HENT:   Head: Normocephalic and atraumatic.   Eyes: Pupils are equal, round, and reactive to light. Conjunctivae and EOM are normal. No scleral icterus.   Neck: Normal range of motion. No JVD present.   Cardiovascular: Normal rate, regular rhythm and normal heart sounds.   No murmur heard.  Pulmonary/Chest: Effort normal and breath sounds normal.   Abdominal: Soft. Bowel sounds are normal. He exhibits no " distension. There is no abdominal tenderness. There is no rebound.   Musculoskeletal: Normal range of motion.      Right knee: Normal. He exhibits normal range of motion, no swelling and normal patellar mobility. No MCL and no LCL tenderness noted.      Comments: Posterior right knee swelling, possible baker's cyst  Crepitus on mcmurrays maneuvering of right knee    No ligamental laxity   Lymphadenopathy:     He has no cervical adenopathy.   Neurological: He is alert and oriented to person, place, and time.   Skin: Skin is warm and dry. He is not diaphoretic. No erythema.   Psychiatric: Mood, memory, affect and judgment normal.            Assessment and Plan:         Problem List Items Addressed This Visit        Cardiac/Vascular    Essential hypertension    Current Assessment & Plan     Patient with elevated SBP ~160s.    - Recommended HCTZ 12.5mg daily  - Blood pressure log at home  - 1 week BP check virtual or in person visit            Oncology    Bladder cancer    Current Assessment & Plan     Patient follows with Urology at [facility].    - Annual PSA, patient reports urologist stated it was wnl  - Annual Cystoscopy, patient reports urologist stated it was wnl            Orthopedic    Chronic pain of right knee - Primary    Current Assessment & Plan     Patient with 2-3 year hx of chronic right knee pain. Worse when going from sitting to standing, incites throbbing pain within the knee area. Patient states that walking around, icy hot, and motrin helps with the pain. No significant aggravating factors. Concern for osteoarthritis vs. Meniscal tear. No ligamental laxity appreciated on exam. Some posterior swelling of the knee joint appreciated.    - Xray right knee; AP lateral and axial; showing bone spur which could be indicative of osteoarthritis. However, patient with concern for possible meniscal tear as well which would not be apparent on examination.  - Physical therapy referral  - Topical Voltaren  gel  - Referral to orthopedics         Relevant Orders    X-ray knee right AP lateral and axial    Ambulatory referral/consult to Physical/Occupational Therapy    Ambulatory referral/consult to Orthopedics       Other    Health maintenance examination    Current Assessment & Plan     Extensive discussion with patient about health maintenance with patient and patient's wife.    - Extensive counseling and discussion about flu vaccine. Patient would like to wait on receiving the flu shot  - Recommending Pneumococcal and Shingles Vaccine. Patient's family amenable to vaccinations but will decide to get them at there nearby pharmacy when ready  - HIV screening  - CBC  - CMP  - HbA1c  - Lipid panel           Relevant Orders    CBC auto differential    Comprehensive Metabolic Panel    Lipid Panel    Hemoglobin A1C    HIV 1/2 Ag/Ab (4th Gen)          Follow Up:   Follow up in about 1 week (around 10/13/2020), or if symptoms worsen or fail to improve.        Darby Vera

## 2020-10-06 NOTE — PATIENT INSTRUCTIONS
- Physical Therapy  - Xray today  - Follow-up in one week  - BP log take BP readings in the morning  - Hydrochlorothiazide 12.5mg daily

## 2020-10-06 NOTE — ASSESSMENT & PLAN NOTE
Patient with elevated SBP ~160s.    - Recommended HCTZ 12.5mg daily  - Blood pressure log at home  - 1 week BP check virtual or in person visit

## 2020-10-06 NOTE — ASSESSMENT & PLAN NOTE
Extensive discussion with patient about health maintenance with patient and patient's wife.    - Extensive counseling and discussion about flu vaccine. Patient would like to wait on receiving the flu shot  - Recommending Pneumococcal and Shingles Vaccine. Patient's family amenable to vaccinations but will decide to get them at there nearby pharmacy when ready  - HIV screening  - CBC  - CMP  - HbA1c  - Lipid panel

## 2020-10-06 NOTE — ASSESSMENT & PLAN NOTE
Patient follows with Urology at [facility].    - Annual PSA, patient reports urologist stated it was wnl  - Annual Cystoscopy, patient reports urologist stated it was wnl

## 2020-10-06 NOTE — ASSESSMENT & PLAN NOTE
Patient with 2-3 year hx of chronic right knee pain. Worse when going from sitting to standing, incites throbbing pain within the knee area. Patient states that walking around, icy hot, and motrin helps with the pain. No significant aggravating factors. Concern for osteoarthritis vs. Meniscal tear. No ligamental laxity appreciated on exam. Some posterior swelling of the knee joint appreciated.    - Xray right knee; AP lateral and axial; showing bone spur which could be indicative of osteoarthritis. However, patient with concern for possible meniscal tear as well which would not be apparent on examination.  - Physical therapy referral  - Topical Voltaren gel  - Referral to orthopedics

## 2020-10-23 RX ORDER — DICLOFENAC SODIUM 10 MG/G
2 GEL TOPICAL 2 TIMES DAILY PRN
Qty: 20 G | Refills: 0 | Status: SHIPPED | OUTPATIENT
Start: 2020-10-23 | End: 2022-07-28

## 2020-11-03 ENCOUNTER — CLINICAL SUPPORT (OUTPATIENT)
Dept: REHABILITATION | Facility: HOSPITAL | Age: 60
End: 2020-11-03
Attending: STUDENT IN AN ORGANIZED HEALTH CARE EDUCATION/TRAINING PROGRAM
Payer: MEDICAID

## 2020-11-03 DIAGNOSIS — R29.898 WEAKNESS OF RIGHT LEG: ICD-10-CM

## 2020-11-03 DIAGNOSIS — M25.561 CHRONIC PAIN OF RIGHT KNEE: ICD-10-CM

## 2020-11-03 DIAGNOSIS — G89.29 CHRONIC PAIN OF RIGHT KNEE: ICD-10-CM

## 2020-11-03 PROCEDURE — 97161 PT EVAL LOW COMPLEX 20 MIN: CPT
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
## 2020-11-11 ENCOUNTER — CLINICAL SUPPORT (OUTPATIENT)
Dept: REHABILITATION | Facility: HOSPITAL | Age: 60
End: 2020-11-11
Attending: STUDENT IN AN ORGANIZED HEALTH CARE EDUCATION/TRAINING PROGRAM
Payer: MEDICAID

## 2020-11-11 DIAGNOSIS — R29.898 WEAKNESS OF RIGHT LEG: ICD-10-CM

## 2020-11-11 PROCEDURE — 97110 THERAPEUTIC EXERCISES: CPT

## 2020-11-12 NOTE — PROGRESS NOTES
"  [facility]  Physical Therapy Note     Name: Nolan Villalobos  Clinic Number: [id number]     Therapy Diagnosis:        Encounter Diagnoses   Name Primary?    Chronic pain of right knee      Weakness of right leg        Physician: Darby Vera MD     Physician Orders: PT Eval and Treat   Medical Diagnosis from Referral: M25.561,G89.29 (ICD-10-CM) - Chronic pain of right knee  Evaluation Date: 11/3/2020  Authorization Period Expiration: 10/6/2021  Plan of Care Expiration: 2/3/2021  Visit # / Visits authorized: 2/   FOTO: 2/5        Time In: 10:20am  Time Out: 11:15am  Total Billable Time: 55 minutes      Precautions: Standard     Subjective   Date of onset: 10/6/2020  History of current condition - Nolan reports: he did do his stretches he received on the first day, felt those helped as he has not stretched since high school.  Still feels like a little "shifting" when he steps on the right foot just below the knee.  Pain rated to 2/10 in the knee.      Objective:    TREATMENT         Nolan received therapeutic exercises to develop strength, endurance, ROM and flexibility for 55  minutes including:  Bike warm up 5 min  gastroc stretch 3x 30 sec  Hamstring stretch manual 4x 30 sec with adduction bias  Bridge with BTB for TA activation 3x10  SAQ to SLR with emphasis on maintaining quad contract with lift off bolster 3x 10  Shuttle ext 1 red band 3x 10  Lateral step 6" step with GCB resist 2x 10, cues for WB through right LE  Forward step up 6" step with GCB resistance 2x10, cues for WB right   Standing hip abd bias on glute med 3x10  Supine piriformis stretch 4x 30 sec  Supine roller to IT band    Pt received K-tape to inferior patella and quad right side for  pain and edema control    Education:  Pt educated on use of K-TApe, given two pre-cut pieces  Educated on expected soreness form activity and need to cont with provided stretches    Assessment   Nolan did very well with PT today.  Cont to " present with wkness in right LE>L, tightness in post LE, and altered activation synergy with LE extension.  Noted excellent pain relief with K-Tape today, noted improved symptoms with bridges when activate glutes.  Pt required multiple cues for perfomrance     Pt prognosis is Good.   Pt will cont to benefit from skilled outpatient Physical Therapy to address the deficits stated above and in the chart below, provide pt/family education, and to maximize pt's level of independence.      Plan of care discussed with patient: Yes  Pt's spiritual, cultural and educational needs considered and patient is agreeable to the plan of care and goals as stated below:      Goals:  Short Term Goals: 5 visits  1. Pt will be compliant /c HEP to supplement PT in order to improve functional tasks  2. Pt will demonstrate improved R HS flexibility to at least lacking 25 deg for improved mobility with functional activities      Long Term Goals: 10 visits   1. Pt will improve R LE MMTs to 4/5 grossly  to improve strength for functional activities  2. Pt will improve FOTO score to </= 19 % limitation to reduce perceived pain with mobility      Plan   Plan of care Certification: 11/3/2020 to 2/3/2021  Cont hip abd/ext strengthening, cont use of K-Tape for pain control

## 2020-11-17 ENCOUNTER — CLINICAL SUPPORT (OUTPATIENT)
Dept: REHABILITATION | Facility: HOSPITAL | Age: 60
End: 2020-11-17
Attending: STUDENT IN AN ORGANIZED HEALTH CARE EDUCATION/TRAINING PROGRAM
Payer: MEDICAID

## 2020-11-17 DIAGNOSIS — R29.898 WEAKNESS OF RIGHT LEG: ICD-10-CM

## 2020-11-17 PROCEDURE — 97110 THERAPEUTIC EXERCISES: CPT

## 2020-11-17 NOTE — PROGRESS NOTES
"  [facility]  Physical Therapy Note     Name: Nolan Villalobos  Clinic Number: [id number]     Therapy Diagnosis:        Encounter Diagnoses   Name Primary?    Chronic pain of right knee      Weakness of right leg        Physician: Darby Vera MD     Physician Orders: PT Eval and Treat   Medical Diagnosis from Referral: M25.561,G89.29 (ICD-10-CM) - Chronic pain of right knee  Evaluation Date: 11/3/2020  Authorization Period Expiration: 10/6/2021  Plan of Care Expiration: 2/3/2021  Visit # / Visits authorized: 2/   FOTO: 2/5        Time In: 10:20am  Time Out: 11:15am  Total Billable Time: 55 minutes      Precautions: Standard     Subjective   Date of onset: 10/6/2020  History of current condition - Nolan reports: he did do his stretches he received on the first day, felt those helped as he has not stretched since high school.  Still feels like a little "shifting" when he steps on the right foot just below the knee.  Pain rated to 2/10 in the knee.      Objective:    TREATMENT         Nolan received therapeutic exercises to develop strength, endurance, ROM and flexibility for 55  minutes including:  Bike warm up 5 min  gastroc stretch 3x 30 sec  Hamstring stretch manual 4x 30 sec with adduction bias  Bridge with BTB for TA activation 3x10  SAQ to SLR with emphasis on maintaining quad contract with lift off bolster 3x 10  Shuttle ext 1 red band 3x 10  Lateral step 6" step with GCB resist 2x 10, cues for WB through right LE  Forward step up 6" step with GCB resistance 2x10, cues for WB right   Standing hip abd bias on glute med 3x10  Supine piriformis stretch 4x 30 sec  Supine roller to IT band    Pt received K-tape to inferior patella and quad right side for  pain and edema control    Education:  Pt educated on use of K-TApe, given two pre-cut pieces  Educated on expected soreness form activity and need to cont with provided stretches    Assessment   Nolan did very well with PT today.  Cont to " present with wkness in right LE>L, tightness in post LE, and altered activation synergy with LE extension.  Noted excellent pain relief with K-Tape today, noted improved symptoms with bridges when activate glutes.  Pt required multiple cues for perfomrance     Pt prognosis is Good.   Pt will cont to benefit from skilled outpatient Physical Therapy to address the deficits stated above and in the chart below, provide pt/family education, and to maximize pt's level of independence.      Plan of care discussed with patient: Yes  Pt's spiritual, cultural and educational needs considered and patient is agreeable to the plan of care and goals as stated below:      Goals:  Short Term Goals: 5 visits  1. Pt will be compliant /c HEP to supplement PT in order to improve functional tasks  2. Pt will demonstrate improved R HS flexibility to at least lacking 25 deg for improved mobility with functional activities      Long Term Goals: 10 visits   1. Pt will improve R LE MMTs to 4/5 grossly  to improve strength for functional activities  2. Pt will improve FOTO score to </= 19 % limitation to reduce perceived pain with mobility      Plan   Plan of care Certification: 11/3/2020 to 2/3/2021  Cont hip abd/ext strengthening, cont use of K-Tape for pain control

## 2020-11-17 NOTE — PROGRESS NOTES
"  [facility]  Physical Therapy Note     Name: Nolan Villalobos  Clinic Number: [id number]     Therapy Diagnosis:        Encounter Diagnoses   Name Primary?    Chronic pain of right knee      Weakness of right leg        Physician: Darby Vera MD     Physician Orders: PT Eval and Treat   Medical Diagnosis from Referral: M25.561,G89.29 (ICD-10-CM) - Chronic pain of right knee  Evaluation Date: 11/3/2020  Authorization Period Expiration: 10/6/2021  Plan of Care Expiration: 2/3/2021  Visit # / Visits authorized: 2/10  FOTO: 2/5        Time In: 09:55 am  Time Out: 10:40 am  Total Billable Time: 45 minutes      Precautions: Standard     Subjective   Date of onset: 10/6/2020  History of current condition - Nolan reports: he is continuing to do his stretches he received on the first day, felt those helped. Still feels like a little "shifting" when he steps on the right foot just below the knee.  Pain rated to 2/10 in the knee.      Objective:    TREATMENT         Nolan received therapeutic exercises to develop strength, endurance, ROM and flexibility for 45  minutes including:  Bike warm up 5 min  gastroc stretch 3x 30 sec  Hamstring stretch manual 4x 30 sec with adduction bias  Bridge with BTB for TA activation 3x10  SAQ to SLR with emphasis on maintaining quad contract with lift off bolster 3x 10  Shuttle ext 1 red band 3x 10  Lateral step 6" step with GCB resist 2x 10, cues for WB through right LE  Forward step up 6" step with GCB resistance 2x10, cues for WB right   Standing hip abd bias on glute med 3x10  Supine piriformis stretch 4x 30 sec  Supine roller to IT band        Education:  Educated on expected soreness form activity and need to cont with provided stretches    Assessment   Nolan did very well with PT today.  Cont to present with wkness in right LE>L, tightness in post LE, and altered activation synergy with LE extension.   Pt required multiple cues for performance. PT will progress " pt therex as tolerated     Pt prognosis is Good.   Pt will cont to benefit from skilled outpatient Physical Therapy to address the deficits stated above and in the chart below, provide pt/family education, and to maximize pt's level of independence.      Plan of care discussed with patient: Yes  Pt's spiritual, cultural and educational needs considered and patient is agreeable to the plan of care and goals as stated below:      Goals:  Short Term Goals: 5 visits  1. Pt will be compliant /c HEP to supplement PT in order to improve functional tasks  2. Pt will demonstrate improved R HS flexibility to at least lacking 25 deg for improved mobility with functional activities      Long Term Goals: 10 visits   1. Pt will improve R LE MMTs to 4/5 grossly  to improve strength for functional activities  2. Pt will improve FOTO score to </= 19 % limitation to reduce perceived pain with mobility      Plan   Plan of care Certification: 11/3/2020 to 2/3/2021  Cont hip abd/ext strengthening, cont use of K-Tape for pain control PRN

## 2020-11-23 NOTE — PROGRESS NOTES
"  [facility]  Physical Therapy Note     Name: Nolan Villalobos  Clinic Number: [id number]     Therapy Diagnosis:        Encounter Diagnoses   Name Primary?    Chronic pain of right knee      Weakness of right leg        Physician: Darby Vera MD     Physician Orders: PT Eval and Treat   Medical Diagnosis from Referral: M25.561,G89.29 (ICD-10-CM) - Chronic pain of right knee  Evaluation Date: 11/3/2020  Authorization Period Expiration: 10/6/2021  Plan of Care Expiration: 2/3/2021  Visit # / Visits authorized: 3/10  FOTO: 3/5        Time In: 09:55 am (pt arrived late)   Time Out: 10:32 am  Total Billable Time: 37 minutes      Precautions: Standard     Subjective   Date of onset: 10/6/2020  History of current condition - Nolan reports: that he continues to perform his HEP and stretches at home. Pt states that he was feeling really good yesterday, therefore over did it with stuff around the house.       Pain: 7/10   Location: Right knee (lateral side)   Objective:    TREATMENT         Nolan received therapeutic exercises to develop strength, endurance, ROM and flexibility for 37  minutes including:  Bike warm up 5 min  gastroc stretch 3x 30 sec  Shuttle ext 1 cord 3x 10  Lateral step 6" step 2x 10, cues for WB through right LE  SL Shuttle with RTB for abductor contraction   R hip abd and diagonal side steps with RTB 10x   Hamstring stretch  With strap 4x 30 sec with adduction bias  Hamstring stretch with strap 4x30"   Supine piriformis stretch 4x 30 sec    NOT PERFORMED:  Bridge with BTB for TA activation 3x10  SAQ to SLR with emphasis on maintaining quad contract with lift off bolster 3x 10  Forward step up 6" step with GCB resistance 2x10, cues for WB right   Standing hip abd bias on glute med 3x10  Supine roller to IT band        Education:  Educated on expected soreness form activity and need to cont with provided stretches    Assessment   Pt able to perform new therex this session in order " to increase hip abductor activation, including side and diagonal steps with RTB and shuttle with band for abductor activation. Pt required minimal verbal cues for correct technique during stretches in order to decrease compensation and to maintain knee extension with hamstring stretches.      Pt prognosis is Good.   Pt will cont to benefit from skilled outpatient Physical Therapy to address the deficits stated above and in the chart below, provide pt/family education, and to maximize pt's level of independence.      Plan of care discussed with patient: Yes  Pt's spiritual, cultural and educational needs considered and patient is agreeable to the plan of care and goals as stated below:      Goals:  Short Term Goals: 5 visits  1. Pt will be compliant /c HEP to supplement PT in order to improve functional tasks  2. Pt will demonstrate improved R HS flexibility to at least lacking 25 deg for improved mobility with functional activities      Long Term Goals: 10 visits   1. Pt will improve R LE MMTs to 4/5 grossly  to improve strength for functional activities  2. Pt will improve FOTO score to </= 19 % limitation to reduce perceived pain with mobility      Plan   Plan of care Certification: 11/3/2020 to 2/3/2021  Cont hip abd/ext strengthening, cont use of K-Tape for pain control PRN         Shyla Iniguez, PTA

## 2020-11-24 ENCOUNTER — CLINICAL SUPPORT (OUTPATIENT)
Dept: REHABILITATION | Facility: HOSPITAL | Age: 60
End: 2020-11-24
Attending: STUDENT IN AN ORGANIZED HEALTH CARE EDUCATION/TRAINING PROGRAM
Payer: MEDICAID

## 2020-11-24 DIAGNOSIS — R29.898 WEAKNESS OF RIGHT LEG: ICD-10-CM

## 2020-11-24 PROCEDURE — 97110 THERAPEUTIC EXERCISES: CPT | Mod: CQ

## 2020-11-30 NOTE — PROGRESS NOTES
"  [facility]  Physical Therapy Note     Name: Nolan Villalobos  Clinic Number: [id number]     Therapy Diagnosis:        Encounter Diagnoses   Name Primary?    Chronic pain of right knee      Weakness of right leg        Physician: Darby Vera MD     Physician Orders: PT Eval and Treat   Medical Diagnosis from Referral: M25.561,G89.29 (ICD-10-CM) - Chronic pain of right knee  Evaluation Date: 11/3/2020  Authorization Period Expiration: 10/6/2021  Plan of Care Expiration: 2/3/2021  Visit # / Visits authorized: 4/10  FOTO: 4/5        Time In: 09:55 am (pt arrived late)   Time Out: 10:48 am  Total Billable Time: 53 minutes      Precautions: Standard     Subjective     History of current condition - Nolan reports: he is feeling better with his exercises. His pain cream has been helping as well.     Pain: 1/10   Location: Right knee (lateral side)       TREATMENT       Objective:  Increased tightness of R piriformis compared with L      Nolan received therapeutic exercises to develop strength, endurance, ROM and flexibility for 53  minutes including:    Bike warm up 5 min  gastroc stretch 3x 30 sec    Lateral step 6" step 2x 10, cues for WB through right LE  DL Shuttle with RTB for abductor contraction 2.5 c  SL shuttle 1.5 c 2x10   Banded lateral step ups with hip abd bias BTB x15 ea   SAQ to SLR with emphasis on maintaining quad contract with lift off bolster 3x 10  Bridge with BTB for TA activation 3x10  Hamstring stretch  With strap 4x 30 sec with adduction bias  Supine piriformis stretch 3x 30 sec B    Next: banded step overs    Pt received K-tape to inferior patella and quad for pain and edema control      NOT PERFORMED:  Shuttle ext 1 cord 3x 10 NP  R hip abd and diagonal side steps with RTB 10x  NP    Forward step up 6" step with GCB resistance 2x10, cues for WB right   Standing hip abd bias on glute med 3x10  Supine roller to IT band        Education:  Educated on expected soreness form " activity and need to cont with provided stretches    Assessment   Pt  Demonstrated R hip abd weakness with double leg press and lateral banded step ups, required verbal cues to correct. He reported feeling good support from KT tape.  He reported muscle soreness, but no increased pain after session.       Pt prognosis is Good.   Pt will cont to benefit from skilled outpatient Physical Therapy to address the deficits stated above and in the chart below, provide pt/family education, and to maximize pt's level of independence.      Plan of care discussed with patient: Yes  Pt's spiritual, cultural and educational needs considered and patient is agreeable to the plan of care and goals as stated below:      Goals:  Short Term Goals: 5 visits  1. Pt will be compliant /c HEP to supplement PT in order to improve functional tasks  2. Pt will demonstrate improved R HS flexibility to at least lacking 25 deg for improved mobility with functional activities      Long Term Goals: 10 visits   1. Pt will improve R LE MMTs to 4/5 grossly  to improve strength for functional activities  2. Pt will improve FOTO score to </= 19 % limitation to reduce perceived pain with mobility      Plan   Plan of care Certification: 11/3/2020 to 2/3/2021  Cont hip abd/ext strengthening, cont use of K-Tape for pain control PRN         Shyla Iniguez, PTA

## 2020-12-01 ENCOUNTER — CLINICAL SUPPORT (OUTPATIENT)
Dept: REHABILITATION | Facility: HOSPITAL | Age: 60
End: 2020-12-01
Attending: STUDENT IN AN ORGANIZED HEALTH CARE EDUCATION/TRAINING PROGRAM
Payer: MEDICAID

## 2020-12-01 DIAGNOSIS — R29.898 WEAKNESS OF RIGHT LEG: ICD-10-CM

## 2020-12-01 PROCEDURE — 97110 THERAPEUTIC EXERCISES: CPT

## 2020-12-08 ENCOUNTER — CLINICAL SUPPORT (OUTPATIENT)
Dept: REHABILITATION | Facility: HOSPITAL | Age: 60
End: 2020-12-08
Attending: STUDENT IN AN ORGANIZED HEALTH CARE EDUCATION/TRAINING PROGRAM
Payer: MEDICAID

## 2020-12-08 DIAGNOSIS — R29.898 WEAKNESS OF RIGHT LEG: ICD-10-CM

## 2020-12-08 PROCEDURE — 97140 MANUAL THERAPY 1/> REGIONS: CPT

## 2020-12-08 PROCEDURE — 97110 THERAPEUTIC EXERCISES: CPT

## 2020-12-08 NOTE — PROGRESS NOTES
"  [facility]  Physical Therapy Note     Name: Nolan Villalobos  Clinic Number: [id number]     Therapy Diagnosis:        Encounter Diagnoses   Name Primary?    Chronic pain of right knee      Weakness of right leg        Physician: Darby Vera MD     Physician Orders: PT Eval and Treat   Medical Diagnosis from Referral: M25.561,G89.29 (ICD-10-CM) - Chronic pain of right knee  Evaluation Date: 11/3/2020  Authorization Period Expiration: 10/6/2021  Plan of Care Expiration: 2/3/2021  Visit # / Visits authorized: 5/10  FOTO: 5/5        Time In: 09:55 am (pt arrived late)   Time Out: 10:30 am  Total Billable Time: 35minutes      Precautions: Standard     Subjective     History of current condition - Nolan reports: his knee is hurting more and feels swollen today. He thinks he overdid it at home.     Pain:6/10   Location: Right knee (lateral side)       TREATMENT       Objective:    FOTO: 37% limitation       Nolan received the following manual therapy techniques:  were applied to the: R LE for 20 minutes, including:  R patellar mobs all planes  STM R posterior knee  K-tape to inferior patella and quad for pain and edema control  Muscle roller to R IT band    Nolan received therapeutic exercises to develop strength, endurance, ROM and flexibility for 15 minutes including:    Bike warm up 5 min  gastroc stretch 3x 30 sec  Hamstring stretch  With strap 4x 30 sec with adduction bias  Supine piriformis stretch 3x 30 sec B NP    Next: banded step overs    NOT PERFORMED:  Lateral step 6" step 2x 10, cues for WB through right LE NP  DL Shuttle with RTB for abductor contraction 2.5 c NP  SL shuttle 1.5 c 2x10  NP  Banded lateral step ups with hip abd bias BTB x15 ea NP  SAQ to SLR with emphasis on maintaining quad contract with lift off bolster 3x 10 NP  Bridge with BTB for TA activation 3x10 NP  Shuttle ext 1 cord 3x 10 NP  R hip abd and diagonal side steps with RTB 10x  NP    Forward step up 6" step with " GCB resistance 2x10, cues for WB right NP  Standing hip abd bias on glute med 3x10NP          Education:  Educated on expected soreness form activity and need to cont with provided stretches  -use of ice at home     Assessment   Pt with noted tightness of R IT band insertion and edema superior to R patella. Pt reported feeling reduced pain & tightness in knee after session. Session was limited due to pt arriving late.       Pt prognosis is Good.   Pt will cont to benefit from skilled outpatient Physical Therapy to address the deficits stated above and in the chart below, provide pt/family education, and to maximize pt's level of independence.      Plan of care discussed with patient: Yes  Pt's spiritual, cultural and educational needs considered and patient is agreeable to the plan of care and goals as stated below:      Goals:  Short Term Goals: 5 visits  1. Pt will be compliant /c HEP to supplement PT in order to improve functional tasks  2. Pt will demonstrate improved R HS flexibility to at least lacking 25 deg for improved mobility with functional activities      Long Term Goals: 10 visits   1. Pt will improve R LE MMTs to 4/5 grossly  to improve strength for functional activities  2. Pt will improve FOTO score to </= 19 % limitation to reduce perceived pain with mobility      Plan   Plan of care Certification: 11/3/2020 to 2/3/2021  Cont hip abd/ext strengthening, cont use of K-Tape for pain control PRANNABELLE Iniguez PTA

## 2020-12-15 ENCOUNTER — DOCUMENTATION ONLY (OUTPATIENT)
Dept: REHABILITATION | Facility: HOSPITAL | Age: 60
End: 2020-12-15

## 2020-12-15 NOTE — PROGRESS NOTES
PT/PTA met face to face to discuss pt's treatment plan and progress towards established goals. Pt will be seen by a physical therapist minimally every 6th visit or every 30 days.    Shyla Iniguez PTA

## 2020-12-21 NOTE — PROGRESS NOTES
"  [facility]  Physical Therapy Note     Name: Nolan Villalobos  Clinic Number: [id number]     Therapy Diagnosis:        Encounter Diagnoses   Name Primary?    Chronic pain of right knee      Weakness of right leg        Physician: Darby Vera MD     Physician Orders: PT Eval and Treat   Medical Diagnosis from Referral: M25.561,G89.29 (ICD-10-CM) - Chronic pain of right knee  Evaluation Date: 11/3/2020  Authorization Period Expiration: 10/6/2021  Plan of Care Expiration: 2/3/2021  Visit # / Visits authorized: 7/10  FOTO: 7        Time In: 09:45am   Time Out: 10:30am  Total Billable Time: 45minutes      Precautions: Standard     Subjective     History of current condition - Nolan reports: his knee has really been hurting behind the knee over the past week. Today it is hurting more in the anterior/lateral compartment. He feels R hip pain after sitting or driving for a while.     Pain:6/10   Location: Right knee (lateral side)       TREATMENT          Nolan received the following manual therapy techniques:  were applied to the: R LE for 30 minutes, including:    R patellar mobs all planes NP  STM R posterior knee  K-tape to inferior patella and quad for pain and edema control  Muscle roller to R IT band & R piriformis   L tibiofemoral distraction    Nolan received therapeutic exercises to develop strength, endurance, ROM and flexibility for 15 minutes including:    Bike warm up 5 min  gastroc stretch 3x 30 sec  Hamstring stretch  With strap 3x 30 sec + 3x30" with adduction bias  Supine piriformis stretch 3x 30 sec R      Next: banded step overs    NOT PERFORMED:  -Lateral step 6" step 2x 10, cues for WB through right LE NP  DL Shuttle with RTB for abductor contraction 2.5 c NP  SL shuttle 1.5 c 2x10  NP  -Banded lateral step ups with hip abd bias BTB x15 ea NP  SAQ to SLR with emphasis on maintaining quad contract with lift off bolster 3x 10 NP  Bridge with BTB for TA activation 3x10 " "NP  -Shuttle ext 1 cord 3x 10 NP  R hip abd and diagonal side steps with RTB 10x  NP    Forward step up 6" step with GCB resistance 2x10, cues for WB right NP  Standing hip abd bias on glute med 3x10NP          Education:  Educated on expected soreness form activity and need to cont with provided stretches  -use of ice at home   -added R hip piriformis stretch to HEP    Assessment   Pt with noted hypertonicity of R piriformis, relieved with manual treatments and stretching. Pt reported feeling good pain relief in both knee & hip after session.       Pt prognosis is Good.   Pt will cont to benefit from skilled outpatient Physical Therapy to address the deficits stated above and in the chart below, provide pt/family education, and to maximize pt's level of independence.      Plan of care discussed with patient: Yes  Pt's spiritual, cultural and educational needs considered and patient is agreeable to the plan of care and goals as stated below:      Goals:  Short Term Goals: 5 visits  1. Pt will be compliant /c HEP to supplement PT in order to improve functional tasks  2. Pt will demonstrate improved R HS flexibility to at least lacking 25 deg for improved mobility with functional activities      Long Term Goals: 10 visits   1. Pt will improve R LE MMTs to 4/5 grossly  to improve strength for functional activities  2. Pt will improve FOTO score to </= 19 % limitation to reduce perceived pain with mobility      Plan   Plan of care Certification: 11/3/2020 to 2/3/2021  Cont hip abd/ext strengthening, and R posterior chain stretching         Iveth Teague, PT              "

## 2020-12-22 ENCOUNTER — CLINICAL SUPPORT (OUTPATIENT)
Dept: REHABILITATION | Facility: HOSPITAL | Age: 60
End: 2020-12-22
Payer: MEDICAID

## 2020-12-22 DIAGNOSIS — R29.898 WEAKNESS OF RIGHT LEG: ICD-10-CM

## 2020-12-22 PROCEDURE — 97140 MANUAL THERAPY 1/> REGIONS: CPT

## 2020-12-22 PROCEDURE — 97110 THERAPEUTIC EXERCISES: CPT

## 2021-01-11 PROBLEM — Z00.00 HEALTH MAINTENANCE EXAMINATION: Status: RESOLVED | Noted: 2020-10-06 | Resolved: 2021-01-11

## 2021-03-09 ENCOUNTER — DOCUMENTATION ONLY (OUTPATIENT)
Dept: REHABILITATION | Facility: HOSPITAL | Age: 61
End: 2021-03-09

## 2021-03-25 ENCOUNTER — DOCUMENTATION ONLY (OUTPATIENT)
Dept: REHABILITATION | Facility: HOSPITAL | Age: 61
End: 2021-03-25

## 2021-04-26 ENCOUNTER — PATIENT MESSAGE (OUTPATIENT)
Dept: RESEARCH | Facility: HOSPITAL | Age: 61
End: 2021-04-26

## 2021-07-27 ENCOUNTER — OFFICE VISIT (OUTPATIENT)
Dept: INTERNAL MEDICINE | Facility: CLINIC | Age: 61
End: 2021-07-27
Payer: MEDICAID

## 2021-07-27 VITALS
HEART RATE: 66 BPM | HEIGHT: 66 IN | OXYGEN SATURATION: 97 % | BODY MASS INDEX: 29.9 KG/M2 | SYSTOLIC BLOOD PRESSURE: 138 MMHG | DIASTOLIC BLOOD PRESSURE: 82 MMHG | WEIGHT: 186.06 LBS

## 2021-07-27 DIAGNOSIS — Z00.00 VISIT FOR ANNUAL HEALTH EXAMINATION: Primary | ICD-10-CM

## 2021-07-27 PROCEDURE — 99213 OFFICE O/P EST LOW 20 MIN: CPT | Mod: PBBFAC | Performed by: STUDENT IN AN ORGANIZED HEALTH CARE EDUCATION/TRAINING PROGRAM

## 2021-07-27 PROCEDURE — 99396 PR PREVENTIVE VISIT,EST,40-64: ICD-10-PCS | Mod: S$PBB,,, | Performed by: STUDENT IN AN ORGANIZED HEALTH CARE EDUCATION/TRAINING PROGRAM

## 2021-07-27 PROCEDURE — 99396 PREV VISIT EST AGE 40-64: CPT | Mod: S$PBB,,, | Performed by: STUDENT IN AN ORGANIZED HEALTH CARE EDUCATION/TRAINING PROGRAM

## 2021-07-27 PROCEDURE — 99999 PR PBB SHADOW E&M-EST. PATIENT-LVL III: CPT | Mod: PBBFAC,,, | Performed by: STUDENT IN AN ORGANIZED HEALTH CARE EDUCATION/TRAINING PROGRAM

## 2021-07-27 PROCEDURE — 99999 PR PBB SHADOW E&M-EST. PATIENT-LVL III: ICD-10-PCS | Mod: PBBFAC,,, | Performed by: STUDENT IN AN ORGANIZED HEALTH CARE EDUCATION/TRAINING PROGRAM

## 2021-09-29 RX ORDER — HYDROCHLOROTHIAZIDE 12.5 MG/1
12.5 TABLET ORAL DAILY
Qty: 90 TABLET | Refills: 0 | Status: SHIPPED | OUTPATIENT
Start: 2021-09-29 | End: 2021-10-01 | Stop reason: SDUPTHER

## 2021-10-05 ENCOUNTER — LAB VISIT (OUTPATIENT)
Dept: LAB | Facility: HOSPITAL | Age: 61
End: 2021-10-05
Payer: MEDICAID

## 2021-10-05 DIAGNOSIS — Z00.00 VISIT FOR ANNUAL HEALTH EXAMINATION: ICD-10-CM

## 2021-10-05 LAB
ALBUMIN SERPL BCP-MCNC: 3.8 G/DL (ref 3.5–5.2)
ALP SERPL-CCNC: 60 U/L (ref 55–135)
ALT SERPL W/O P-5'-P-CCNC: 21 U/L (ref 10–44)
ANION GAP SERPL CALC-SCNC: 10 MMOL/L (ref 8–16)
AST SERPL-CCNC: 19 U/L (ref 10–40)
BASOPHILS # BLD AUTO: 0.04 K/UL (ref 0–0.2)
BASOPHILS NFR BLD: 0.5 % (ref 0–1.9)
BILIRUB SERPL-MCNC: 0.5 MG/DL (ref 0.1–1)
BUN SERPL-MCNC: 7 MG/DL (ref 6–20)
CALCIUM SERPL-MCNC: 9.5 MG/DL (ref 8.7–10.5)
CHLORIDE SERPL-SCNC: 102 MMOL/L (ref 95–110)
CHOLEST SERPL-MCNC: 186 MG/DL (ref 120–199)
CHOLEST/HDLC SERPL: 3.4 {RATIO} (ref 2–5)
CO2 SERPL-SCNC: 27 MMOL/L (ref 23–29)
CREAT SERPL-MCNC: 0.7 MG/DL (ref 0.5–1.4)
DIFFERENTIAL METHOD: ABNORMAL
EOSINOPHIL # BLD AUTO: 0.2 K/UL (ref 0–0.5)
EOSINOPHIL NFR BLD: 2.7 % (ref 0–8)
ERYTHROCYTE [DISTWIDTH] IN BLOOD BY AUTOMATED COUNT: 14.5 % (ref 11.5–14.5)
EST. GFR  (AFRICAN AMERICAN): >60 ML/MIN/1.73 M^2
EST. GFR  (NON AFRICAN AMERICAN): >60 ML/MIN/1.73 M^2
ESTIMATED AVG GLUCOSE: 100 MG/DL (ref 68–131)
GLUCOSE SERPL-MCNC: 93 MG/DL (ref 70–110)
HBA1C MFR BLD: 5.1 % (ref 4–5.6)
HCT VFR BLD AUTO: 44.4 % (ref 40–54)
HDLC SERPL-MCNC: 55 MG/DL (ref 40–75)
HDLC SERPL: 29.6 % (ref 20–50)
HGB BLD-MCNC: 14.1 G/DL (ref 14–18)
IMM GRANULOCYTES # BLD AUTO: 0.02 K/UL (ref 0–0.04)
IMM GRANULOCYTES NFR BLD AUTO: 0.3 % (ref 0–0.5)
LDLC SERPL CALC-MCNC: 116.4 MG/DL (ref 63–159)
LYMPHOCYTES # BLD AUTO: 2.4 K/UL (ref 1–4.8)
LYMPHOCYTES NFR BLD: 32.1 % (ref 18–48)
MCH RBC QN AUTO: 26.3 PG (ref 27–31)
MCHC RBC AUTO-ENTMCNC: 31.8 G/DL (ref 32–36)
MCV RBC AUTO: 83 FL (ref 82–98)
MONOCYTES # BLD AUTO: 0.9 K/UL (ref 0.3–1)
MONOCYTES NFR BLD: 12.5 % (ref 4–15)
NEUTROPHILS # BLD AUTO: 3.9 K/UL (ref 1.8–7.7)
NEUTROPHILS NFR BLD: 51.9 % (ref 38–73)
NONHDLC SERPL-MCNC: 131 MG/DL
NRBC BLD-RTO: 0 /100 WBC
PLATELET # BLD AUTO: 301 K/UL (ref 150–450)
PMV BLD AUTO: 11.3 FL (ref 9.2–12.9)
POTASSIUM SERPL-SCNC: 3.9 MMOL/L (ref 3.5–5.1)
PROT SERPL-MCNC: 7.4 G/DL (ref 6–8.4)
RBC # BLD AUTO: 5.37 M/UL (ref 4.6–6.2)
SODIUM SERPL-SCNC: 139 MMOL/L (ref 136–145)
TRIGL SERPL-MCNC: 73 MG/DL (ref 30–150)
WBC # BLD AUTO: 7.5 K/UL (ref 3.9–12.7)

## 2021-10-05 PROCEDURE — 36415 COLL VENOUS BLD VENIPUNCTURE: CPT | Performed by: STUDENT IN AN ORGANIZED HEALTH CARE EDUCATION/TRAINING PROGRAM

## 2021-10-05 PROCEDURE — 85025 COMPLETE CBC W/AUTO DIFF WBC: CPT | Performed by: STUDENT IN AN ORGANIZED HEALTH CARE EDUCATION/TRAINING PROGRAM

## 2021-10-05 PROCEDURE — 80061 LIPID PANEL: CPT | Performed by: STUDENT IN AN ORGANIZED HEALTH CARE EDUCATION/TRAINING PROGRAM

## 2021-10-05 PROCEDURE — 80053 COMPREHEN METABOLIC PANEL: CPT | Performed by: STUDENT IN AN ORGANIZED HEALTH CARE EDUCATION/TRAINING PROGRAM

## 2021-10-05 PROCEDURE — 83036 HEMOGLOBIN GLYCOSYLATED A1C: CPT | Performed by: STUDENT IN AN ORGANIZED HEALTH CARE EDUCATION/TRAINING PROGRAM

## 2021-10-06 RX ORDER — HYDROCHLOROTHIAZIDE 12.5 MG/1
12.5 TABLET ORAL DAILY
Qty: 90 TABLET | Refills: 0 | Status: SHIPPED | OUTPATIENT
Start: 2021-10-06 | End: 2022-03-19 | Stop reason: SDUPTHER

## 2022-06-22 NOTE — TELEPHONE ENCOUNTER
----- Message from Isabell Martin sent at 6/22/2022  1:16 PM CDT -----  Contact: [phone]  Requesting an RX refill or new RX.  Is this a refill or new RX: refill  RX name and strength (copy/paste from chart):  hydroCHLOROthiazide (HYDRODIURIL) 12.5 MG Tab  Is this a 30 day or 90 day RX: 90  Pharmacy name and phone # (copy/paste from chart):    [facility]/pharmacy #[id number] - [place] - [address]  [address]  [place] [address]  Phone: [phone] Fax: [phone]      The doctors have asked that we provide their patients with the following 2 reminders -- prescription refills can take up to 72 hours, and a friendly reminder that in the future you can use your [facility] account to request refills: call back

## 2022-06-23 RX ORDER — HYDROCHLOROTHIAZIDE 12.5 MG/1
12.5 TABLET ORAL DAILY
Qty: 90 TABLET | Refills: 0 | Status: SHIPPED | OUTPATIENT
Start: 2022-06-23 | End: 2022-09-19

## 2022-07-28 ENCOUNTER — OFFICE VISIT (OUTPATIENT)
Dept: INTERNAL MEDICINE | Facility: CLINIC | Age: 62
End: 2022-07-28
Payer: MEDICAID

## 2022-07-28 VITALS
DIASTOLIC BLOOD PRESSURE: 86 MMHG | HEART RATE: 55 BPM | BODY MASS INDEX: 29.37 KG/M2 | HEIGHT: 66 IN | SYSTOLIC BLOOD PRESSURE: 146 MMHG | OXYGEN SATURATION: 98 % | WEIGHT: 182.75 LBS

## 2022-07-28 DIAGNOSIS — I10 ESSENTIAL HYPERTENSION: ICD-10-CM

## 2022-07-28 DIAGNOSIS — Z76.89 ENCOUNTER TO ESTABLISH CARE: Primary | ICD-10-CM

## 2022-07-28 PROCEDURE — 3079F PR MOST RECENT DIASTOLIC BLOOD PRESSURE 80-89 MM HG: ICD-10-PCS | Mod: CPTII,,, | Performed by: STUDENT IN AN ORGANIZED HEALTH CARE EDUCATION/TRAINING PROGRAM

## 2022-07-28 PROCEDURE — 3008F PR BODY MASS INDEX (BMI) DOCUMENTED: ICD-10-PCS | Mod: CPTII,,, | Performed by: STUDENT IN AN ORGANIZED HEALTH CARE EDUCATION/TRAINING PROGRAM

## 2022-07-28 PROCEDURE — 99213 PR OFFICE/OUTPT VISIT, EST, LEVL III, 20-29 MIN: ICD-10-PCS | Mod: S$PBB,,, | Performed by: STUDENT IN AN ORGANIZED HEALTH CARE EDUCATION/TRAINING PROGRAM

## 2022-07-28 PROCEDURE — 3079F DIAST BP 80-89 MM HG: CPT | Mod: CPTII,,, | Performed by: STUDENT IN AN ORGANIZED HEALTH CARE EDUCATION/TRAINING PROGRAM

## 2022-07-28 PROCEDURE — 99999 PR PBB SHADOW E&M-EST. PATIENT-LVL IV: CPT | Mod: PBBFAC,,, | Performed by: STUDENT IN AN ORGANIZED HEALTH CARE EDUCATION/TRAINING PROGRAM

## 2022-07-28 PROCEDURE — 1160F RVW MEDS BY RX/DR IN RCRD: CPT | Mod: CPTII,,, | Performed by: STUDENT IN AN ORGANIZED HEALTH CARE EDUCATION/TRAINING PROGRAM

## 2022-07-28 PROCEDURE — 1159F PR MEDICATION LIST DOCUMENTED IN MEDICAL RECORD: ICD-10-PCS | Mod: CPTII,,, | Performed by: STUDENT IN AN ORGANIZED HEALTH CARE EDUCATION/TRAINING PROGRAM

## 2022-07-28 PROCEDURE — 99213 OFFICE O/P EST LOW 20 MIN: CPT | Mod: S$PBB,,, | Performed by: STUDENT IN AN ORGANIZED HEALTH CARE EDUCATION/TRAINING PROGRAM

## 2022-07-28 PROCEDURE — 3008F BODY MASS INDEX DOCD: CPT | Mod: CPTII,,, | Performed by: STUDENT IN AN ORGANIZED HEALTH CARE EDUCATION/TRAINING PROGRAM

## 2022-07-28 PROCEDURE — 99999 PR PBB SHADOW E&M-EST. PATIENT-LVL IV: ICD-10-PCS | Mod: PBBFAC,,, | Performed by: STUDENT IN AN ORGANIZED HEALTH CARE EDUCATION/TRAINING PROGRAM

## 2022-07-28 PROCEDURE — 1160F PR REVIEW ALL MEDS BY PRESCRIBER/CLIN PHARMACIST DOCUMENTED: ICD-10-PCS | Mod: CPTII,,, | Performed by: STUDENT IN AN ORGANIZED HEALTH CARE EDUCATION/TRAINING PROGRAM

## 2022-07-28 PROCEDURE — 1159F MED LIST DOCD IN RCRD: CPT | Mod: CPTII,,, | Performed by: STUDENT IN AN ORGANIZED HEALTH CARE EDUCATION/TRAINING PROGRAM

## 2022-07-28 PROCEDURE — 3077F SYST BP >= 140 MM HG: CPT | Mod: CPTII,,, | Performed by: STUDENT IN AN ORGANIZED HEALTH CARE EDUCATION/TRAINING PROGRAM

## 2022-07-28 PROCEDURE — 3077F PR MOST RECENT SYSTOLIC BLOOD PRESSURE >= 140 MM HG: ICD-10-PCS | Mod: CPTII,,, | Performed by: STUDENT IN AN ORGANIZED HEALTH CARE EDUCATION/TRAINING PROGRAM

## 2022-07-28 PROCEDURE — 99214 OFFICE O/P EST MOD 30 MIN: CPT | Mod: PBBFAC | Performed by: STUDENT IN AN ORGANIZED HEALTH CARE EDUCATION/TRAINING PROGRAM

## 2022-07-28 NOTE — PATIENT INSTRUCTIONS
You were seen today to establish care with new doctor     After today's visit.   Please continue to work on blood pressure.   Keep blood pressure log and bring to next visit.       Low-Salt Choices  Eating salt (sodium) can make your body retain too much water. Excess water makes your heart work harder. Canned, packaged, and frozen foods are easy to prepare, but they are often high in sodium. Here are some ideas for low-salt foods you can easily prepare yourself.    For Breakfast  Fruit or fruit juice  Bread or an English muffin  Shredded wheat  [place] tortillas  Steamed rice, unsalted  Hot cereal, regular (not instant) made without salt  Stay away from:  Sausage, lizarraga, ham  Flour tortillas  Packaged muffins, pancakes, and biscuits  For Lunch and Dinner  Fresh fish, chicken, turkey, or meat-- baked, broiled, or roasted without salt  Dry beans, cooked without salt  Tofu, stir-fried without salt  Stay away from:  Lunch meat  Cheese  Tomato juice and catsup  Canned vegetables, soups, fish  Packaged gravies and sauces  Olives, pickles, relish  Bottled salad dressings  For Snacks and Desserts  Yogurt  Popcorn, air popped, unsalted  Stay away from:  Pies  Canned and packaged puddings  Pretzels, chips, crackers, and nuts--unless the label says unsalted  © 1394-2598 [employer], [address] [place] [address], [place] [address]. All rights reserved. This information is not intended as a substitute for professional medical care. Always follow your healthcare professional's instructions.

## 2022-07-28 NOTE — PROGRESS NOTES
Clinic Note  7/28/2022      Subjective:       Patient ID:  Nolan is a 62 y.o. male being seen for an established visit.    Chief Complaint: Annual Exam    Mr. Villalobos is 60 yo m w/ PMHx of Bladder Cancer s/p tumor resection and bCG tx in 2012 w/ no radiation or chemo who presents to establish care. Previously followed by Dr. Darby Vera.     History of Bladder Cancer  -stable, denies hematuria or dysuria   -Follows with Dr. Pemberton with Urology at [facility]  -annual surveillance with PSA and cystoscopy      Chronic Right knee pain: Improved after PT     HTN:  - HCTZ 12.5MG  -BP at home not taken regularly   -denies HA/vision changes/dizzines/sycnope         Health maintenance    Colorectal Cancer Screening performed 5/22/2013. Patient's wife says that C-scope was negative.   Next C-scope due in 2023. Crow Gauthier [phone], GI who performed the colonoscopy.     Pneumococcal vaccine, shingles vaccine, COVID-19 vaccine recommended. Patient declines vaccines at this time.        Review of Systems   Constitutional: Negative for chills, diaphoresis, fever, malaise/fatigue and weight loss.   HENT: Negative for sore throat.    Eyes: Negative for blurred vision.   Respiratory: Negative for cough and shortness of breath.    Cardiovascular: Negative for chest pain, palpitations and leg swelling.   Gastrointestinal: Negative for abdominal pain, constipation, diarrhea, heartburn, nausea and vomiting.   Genitourinary: Negative for dysuria.   Musculoskeletal: Negative for joint pain and myalgias.   Neurological: Negative for tingling, weakness and headaches.   Endo/Heme/Allergies: Negative for environmental allergies.   Psychiatric/Behavioral: Negative for depression. The patient does not have insomnia.        Past Medical History:   Diagnosis Date    Bladder cancer        Family History   Problem Relation Age of Onset    Hypertension Mother     Cancer Mother         CRC    Hypertension Father     Cancer Brother          "katie ca        reports that he has never smoked. He does not have any smokeless tobacco history on file. He reports current alcohol use of about 1.0 standard drink of alcohol per week.    Medication List with Changes/Refills   Current Medications    ASCORBIC ACID, VITAMIN C, (VITAMIN C) 100 MG TABLET    Take 1,000 mg by mouth once daily.    DICLOFENAC SODIUM (VOLTAREN) 1 % GEL    Apply 2 g topically 2 (two) times daily as needed.    DIPHTH,PERTUS,ACELL,,TETANUS (BOOSTRIX TDAP) 2.5-8-5 LF-MCG-LF/0.5ML SUSP    Inject 0.5 mLs into the muscle.    HYDROCHLOROTHIAZIDE (HYDRODIURIL) 12.5 MG TAB    Take 1 tablet (12.5 mg total) by mouth once daily.    MULTIVITAMIN CAPSULE    Take 1 capsule by mouth once daily.     Review of patient's allergies indicates:  No Known Allergies    Patient Active Problem List   Diagnosis    Bladder cancer    Left hip pain    Chronic pain of right knee    Essential hypertension    Weakness of right leg           Objective:      BP (!) 146/86   Pulse (!) 55   Ht 5' 6" (1.676 m)   Wt 82.9 kg (182 lb 12.2 oz)   SpO2 98%   BMI 29.50 kg/m²   Estimated body mass index is 29.5 kg/m² as calculated from the following:    Height as of this encounter: 5' 6" (1.676 m).    Weight as of this encounter: 82.9 kg (182 lb 12.2 oz).  Physical Exam  Constitutional:       General: He is not in acute distress.     Appearance: Normal appearance. He is not diaphoretic.   HENT:      Head: Normocephalic and atraumatic.      Nose: Nose normal.      Mouth/Throat:      Mouth: Mucous membranes are moist.   Eyes:      General: No scleral icterus.     Extraocular Movements: Extraocular movements intact.      Conjunctiva/sclera: Conjunctivae normal.   Neck:      Thyroid: No thyromegaly.   Cardiovascular:      Rate and Rhythm: Normal rate and regular rhythm.      Pulses: Normal pulses.      Heart sounds: Normal heart sounds.   Pulmonary:      Effort: Pulmonary effort is normal.      Breath sounds: Normal breath sounds. " No wheezing or rales.   Abdominal:      General: Bowel sounds are normal. There is no distension.      Palpations: Abdomen is soft. There is no mass.      Tenderness: There is no abdominal tenderness.   Musculoskeletal:         General: Normal range of motion.      Cervical back: Normal range of motion.      Right lower leg: No edema.      Left lower leg: No edema.   Skin:     General: Skin is warm and dry.      Findings: No rash.   Neurological:      General: No focal deficit present.      Mental Status: He is alert and oriented to person, place, and time.   Psychiatric:         Mood and Affect: Mood and affect normal.         Behavior: Behavior normal.         Thought Content: Thought content normal.         Judgment: Judgment normal.           Assessment and Plan:         Nolan was seen today to establish care. Will order annual labs when pt returns to clinic in 3 months.     Diagnoses and all orders for this visit:    Encounter to establish care    Essential hypertension  -continue HCTZ 12.5. RTC in 3 mo with BP log. If still hypertensive, will add on 2nd antihypertensive (likely Valsartan to offset potassium losses by HCTZ)  -BP log   -Limit salt intake to <2g/daily     History of Bladder cancer   -continue current therapy and surveilance with Dr. Figueredo     Follow up in about 3 months (around 10/28/2022).      Other Orders Placed This Visit:  No orders of the defined types were placed in this encounter.        Piyush Botello MD  Internal Medicine, PGY-3    Discussed with Dr. Peñaloza

## 2022-08-02 NOTE — PROGRESS NOTES
I have reviewed patient's chart and discussed case in detail with resident. I agree with assessment and plan.

## 2022-08-29 ENCOUNTER — PATIENT MESSAGE (OUTPATIENT)
Dept: INTERNAL MEDICINE | Facility: CLINIC | Age: 62
End: 2022-08-29
Payer: MEDICAID

## 2022-10-18 ENCOUNTER — OFFICE VISIT (OUTPATIENT)
Dept: INTERNAL MEDICINE | Facility: CLINIC | Age: 62
End: 2022-10-18
Payer: MEDICAID

## 2022-10-18 VITALS
SYSTOLIC BLOOD PRESSURE: 136 MMHG | BODY MASS INDEX: 30.11 KG/M2 | OXYGEN SATURATION: 96 % | HEART RATE: 69 BPM | HEIGHT: 66 IN | DIASTOLIC BLOOD PRESSURE: 92 MMHG | WEIGHT: 187.38 LBS

## 2022-10-18 DIAGNOSIS — I10 ESSENTIAL HYPERTENSION: Primary | ICD-10-CM

## 2022-10-18 PROCEDURE — 99213 OFFICE O/P EST LOW 20 MIN: CPT | Mod: S$PBB,,, | Performed by: STUDENT IN AN ORGANIZED HEALTH CARE EDUCATION/TRAINING PROGRAM

## 2022-10-18 PROCEDURE — 3075F PR MOST RECENT SYSTOLIC BLOOD PRESS GE 130-139MM HG: ICD-10-PCS | Mod: CPTII,,, | Performed by: STUDENT IN AN ORGANIZED HEALTH CARE EDUCATION/TRAINING PROGRAM

## 2022-10-18 PROCEDURE — 99213 OFFICE O/P EST LOW 20 MIN: CPT | Mod: PBBFAC | Performed by: STUDENT IN AN ORGANIZED HEALTH CARE EDUCATION/TRAINING PROGRAM

## 2022-10-18 PROCEDURE — 3075F SYST BP GE 130 - 139MM HG: CPT | Mod: CPTII,,, | Performed by: STUDENT IN AN ORGANIZED HEALTH CARE EDUCATION/TRAINING PROGRAM

## 2022-10-18 PROCEDURE — 4010F PR ACE/ARB THEARPY RXD/TAKEN: ICD-10-PCS | Mod: CPTII,,, | Performed by: STUDENT IN AN ORGANIZED HEALTH CARE EDUCATION/TRAINING PROGRAM

## 2022-10-18 PROCEDURE — 99999 PR PBB SHADOW E&M-EST. PATIENT-LVL III: ICD-10-PCS | Mod: PBBFAC,,, | Performed by: STUDENT IN AN ORGANIZED HEALTH CARE EDUCATION/TRAINING PROGRAM

## 2022-10-18 PROCEDURE — 3080F DIAST BP >= 90 MM HG: CPT | Mod: CPTII,,, | Performed by: STUDENT IN AN ORGANIZED HEALTH CARE EDUCATION/TRAINING PROGRAM

## 2022-10-18 PROCEDURE — 4010F ACE/ARB THERAPY RXD/TAKEN: CPT | Mod: CPTII,,, | Performed by: STUDENT IN AN ORGANIZED HEALTH CARE EDUCATION/TRAINING PROGRAM

## 2022-10-18 PROCEDURE — 99213 PR OFFICE/OUTPT VISIT, EST, LEVL III, 20-29 MIN: ICD-10-PCS | Mod: S$PBB,,, | Performed by: STUDENT IN AN ORGANIZED HEALTH CARE EDUCATION/TRAINING PROGRAM

## 2022-10-18 PROCEDURE — 99999 PR PBB SHADOW E&M-EST. PATIENT-LVL III: CPT | Mod: PBBFAC,,, | Performed by: STUDENT IN AN ORGANIZED HEALTH CARE EDUCATION/TRAINING PROGRAM

## 2022-10-18 PROCEDURE — 3080F PR MOST RECENT DIASTOLIC BLOOD PRESSURE >= 90 MM HG: ICD-10-PCS | Mod: CPTII,,, | Performed by: STUDENT IN AN ORGANIZED HEALTH CARE EDUCATION/TRAINING PROGRAM

## 2022-10-18 RX ORDER — VALSARTAN 80 MG/1
80 TABLET ORAL DAILY
Qty: 90 TABLET | Refills: 3 | Status: SHIPPED | OUTPATIENT
Start: 2022-10-18 | End: 2023-01-19 | Stop reason: SDUPTHER

## 2022-10-18 NOTE — PATIENT INSTRUCTIONS
You were seen today for follow up for blood pressure     After today's visit:   Continue to take your blood pressure readings until you see in clinic.   Take BP readings 3 times a week.   Take 1st BP reading in the AM  Take 2nd BP reading at least 2 hours after taking BP medications    new medication from pharmacy (valsartan) and start taking along with hydrochlorothiazide   Please get lab 1 week prior to seeing me back in clinic (in 3 months) to make sure your electrolytes are ok.

## 2022-10-18 NOTE — PROGRESS NOTES
Clinic Note  10/18/2022      Subjective:       Patient ID:  Nolan is a 62 y.o. male being seen for an established visit.    Chief Complaint: Follow-up    Mr. Villalobos is 60 yo m w/ PMHx of Bladder Cancer s/p tumor resection and bCG tx in 2012 w/ no radiation or chemo who presents to establish care. Previously followed by Dr. Darby Vera.     History of Bladder Cancer  -stable, denies hematuria or dysuria   -Follows with [facility] Pember[facility]  -annual surveillance with PSA and cystoscopy      Chronic Right knee pain: Improved after PT     HTN:  - HCTZ 12.5mg qd  -BP at home 120-160s/80-90s  -denies HA/vision changes/dizzines/sycnope         Health maintenance    Colorectal Cancer Screening performed 5/22/2013. Patient's wife says that C-scope was negative.   Next C-scope due in 2023. Crow Gauthier [phone], GI who performed the colonoscopy.     Pneumococcal vaccine, shingles vaccine, COVID-19 vaccine recommended. Patient declines vaccines at this time.      Follow-up  Pertinent negatives include no abdominal pain, chest pain, chills, coughing, diaphoresis, fever, headaches, myalgias, nausea, sore throat, vomiting or weakness.     Review of Systems   Constitutional:  Negative for chills, diaphoresis, fever, malaise/fatigue and weight loss.   HENT:  Negative for sore throat.    Eyes:  Negative for blurred vision.   Respiratory:  Negative for cough and shortness of breath.    Cardiovascular:  Negative for chest pain, palpitations and leg swelling.   Gastrointestinal:  Negative for abdominal pain, constipation, diarrhea, heartburn, nausea and vomiting.   Genitourinary:  Negative for dysuria.   Musculoskeletal:  Negative for joint pain and myalgias.   Neurological:  Negative for tingling, weakness and headaches.   Endo/Heme/Allergies:  Negative for environmental allergies.   Psychiatric/Behavioral:  Negative for depression. The patient does not have insomnia.      Past Medical History:   Diagnosis Date  "   Bladder cancer        Family History   Problem Relation Age of Onset    Hypertension Mother     Cancer Mother         CRC    Hypertension Father     Cancer Brother         katie brown        reports that he has never smoked. He does not have any smokeless tobacco history on file. He reports current alcohol use of about 1.0 standard drink per week.    Medication List with Changes/Refills   New Medications    VALSARTAN (DIOVAN) 80 MG TABLET    Take 1 tablet (80 mg total) by mouth once daily.   Current Medications    ASCORBIC ACID, VITAMIN C, (VITAMIN C) 100 MG TABLET    Take 1,000 mg by mouth once daily.    DIPHTH,PERTUS,ACELL,,TETANUS (BOOSTRIX TDAP) 2.5-8-5 LF-MCG-LF/0.5ML SUSP    Inject 0.5 mLs into the muscle.    HYDROCHLOROTHIAZIDE (HYDRODIURIL) 12.5 MG TAB    Take 1 tablet (12.5 mg total) by mouth once daily.    MULTIVITAMIN CAPSULE    Take 1 capsule by mouth once daily.     Review of patient's allergies indicates:  No Known Allergies    Patient Active Problem List   Diagnosis    Bladder cancer    Left hip pain    Chronic pain of right knee    Essential hypertension    Weakness of right leg           Objective:      BP (!) 136/92 (BP Location: Right arm, Patient Position: Sitting, BP Method: Large (Manual))   Pulse 69   Ht 5' 6" (1.676 m)   Wt 85 kg (187 lb 6.3 oz)   SpO2 96%   BMI 30.25 kg/m²   Estimated body mass index is 30.25 kg/m² as calculated from the following:    Height as of this encounter: 5' 6" (1.676 m).    Weight as of this encounter: 85 kg (187 lb 6.3 oz).  Physical Exam  Constitutional:       General: He is not in acute distress.     Appearance: Normal appearance. He is not diaphoretic.   HENT:      Head: Normocephalic and atraumatic.      Nose: Nose normal.      Mouth/Throat:      Mouth: Mucous membranes are moist.   Eyes:      General: No scleral icterus.     Extraocular Movements: Extraocular movements intact.      Conjunctiva/sclera: Conjunctivae normal.   Neck:      Thyroid: No " thyromegaly.   Cardiovascular:      Rate and Rhythm: Normal rate and regular rhythm.      Pulses: Normal pulses.      Heart sounds: Normal heart sounds.   Pulmonary:      Effort: Pulmonary effort is normal.      Breath sounds: Normal breath sounds. No wheezing or rales.   Abdominal:      General: Bowel sounds are normal. There is no distension.      Palpations: Abdomen is soft. There is no mass.      Tenderness: There is no abdominal tenderness.   Musculoskeletal:         General: Normal range of motion.      Cervical back: Normal range of motion.      Right lower leg: No edema.      Left lower leg: No edema.   Skin:     General: Skin is warm and dry.      Findings: No rash.   Neurological:      General: No focal deficit present.      Mental Status: He is alert and oriented to person, place, and time.   Psychiatric:         Mood and Affect: Mood and affect normal.         Behavior: Behavior normal.         Thought Content: Thought content normal.         Judgment: Judgment normal.         Assessment and Plan:   62 M with HTN that remains uncontrolled, will add 2nd antihypertensive and have RTC in 3 mo. Discussed with both patient and wife importance of maintaining BP log until next office visit. Will obtain BMP 1 week prior to next office visit.     Diagnoses and all orders for this visit:    Follow up:     Essential hypertension  -     valsartan (DIOVAN) 80 MG tablet; Take 1 tablet (80 mg total) by mouth once daily.  - Continue HCTZ 12.5mg qd  -     BASIC METABOLIC PANEL; Future  - BP log         - Limit salt intake to <2g/daily     History of Bladder cancer   -continue current therapy and surveilance with Dr. Figueredo     Follow up in about 3 months (around 1/18/2023).      Other Orders Placed This Visit:  Orders Placed This Encounter   Procedures    BASIC METABOLIC PANEL         Piyush Botello MD  Internal Medicine, PGY-3    Discussed with Dr. Gasca     I have discussed A/P with Dr Botello  and agree with plan of  action.  Joe Pedro.

## 2023-01-10 ENCOUNTER — LAB VISIT (OUTPATIENT)
Dept: LAB | Facility: HOSPITAL | Age: 63
End: 2023-01-10
Payer: MEDICAID

## 2023-01-10 DIAGNOSIS — I10 ESSENTIAL HYPERTENSION: ICD-10-CM

## 2023-01-10 LAB
ANION GAP SERPL CALC-SCNC: 10 MMOL/L (ref 8–16)
BUN SERPL-MCNC: 10 MG/DL (ref 8–23)
CALCIUM SERPL-MCNC: 9.8 MG/DL (ref 8.7–10.5)
CHLORIDE SERPL-SCNC: 101 MMOL/L (ref 95–110)
CO2 SERPL-SCNC: 31 MMOL/L (ref 23–29)
CREAT SERPL-MCNC: 0.8 MG/DL (ref 0.5–1.4)
EST. GFR  (NO RACE VARIABLE): >60 ML/MIN/1.73 M^2
GLUCOSE SERPL-MCNC: 90 MG/DL (ref 70–110)
POTASSIUM SERPL-SCNC: 3.8 MMOL/L (ref 3.5–5.1)
SODIUM SERPL-SCNC: 142 MMOL/L (ref 136–145)

## 2023-01-10 PROCEDURE — 36415 COLL VENOUS BLD VENIPUNCTURE: CPT | Performed by: STUDENT IN AN ORGANIZED HEALTH CARE EDUCATION/TRAINING PROGRAM

## 2023-01-10 PROCEDURE — 80048 BASIC METABOLIC PNL TOTAL CA: CPT | Performed by: STUDENT IN AN ORGANIZED HEALTH CARE EDUCATION/TRAINING PROGRAM

## 2023-01-19 ENCOUNTER — OFFICE VISIT (OUTPATIENT)
Dept: INTERNAL MEDICINE | Facility: CLINIC | Age: 63
End: 2023-01-19
Payer: MEDICAID

## 2023-01-19 VITALS
WEIGHT: 186.31 LBS | SYSTOLIC BLOOD PRESSURE: 132 MMHG | BODY MASS INDEX: 29.94 KG/M2 | HEIGHT: 66 IN | OXYGEN SATURATION: 97 % | DIASTOLIC BLOOD PRESSURE: 78 MMHG | HEART RATE: 76 BPM

## 2023-01-19 DIAGNOSIS — I10 ESSENTIAL HYPERTENSION: Primary | ICD-10-CM

## 2023-01-19 PROCEDURE — 3075F PR MOST RECENT SYSTOLIC BLOOD PRESS GE 130-139MM HG: ICD-10-PCS | Mod: CPTII,,, | Performed by: STUDENT IN AN ORGANIZED HEALTH CARE EDUCATION/TRAINING PROGRAM

## 2023-01-19 PROCEDURE — 99213 PR OFFICE/OUTPT VISIT, EST, LEVL III, 20-29 MIN: ICD-10-PCS | Mod: S$PBB,,, | Performed by: STUDENT IN AN ORGANIZED HEALTH CARE EDUCATION/TRAINING PROGRAM

## 2023-01-19 PROCEDURE — 99999 PR PBB SHADOW E&M-EST. PATIENT-LVL III: CPT | Mod: PBBFAC,,, | Performed by: STUDENT IN AN ORGANIZED HEALTH CARE EDUCATION/TRAINING PROGRAM

## 2023-01-19 PROCEDURE — 3008F PR BODY MASS INDEX (BMI) DOCUMENTED: ICD-10-PCS | Mod: CPTII,,, | Performed by: STUDENT IN AN ORGANIZED HEALTH CARE EDUCATION/TRAINING PROGRAM

## 2023-01-19 PROCEDURE — 99213 OFFICE O/P EST LOW 20 MIN: CPT | Mod: PBBFAC | Performed by: STUDENT IN AN ORGANIZED HEALTH CARE EDUCATION/TRAINING PROGRAM

## 2023-01-19 PROCEDURE — 4010F PR ACE/ARB THEARPY RXD/TAKEN: ICD-10-PCS | Mod: CPTII,,, | Performed by: STUDENT IN AN ORGANIZED HEALTH CARE EDUCATION/TRAINING PROGRAM

## 2023-01-19 PROCEDURE — 3078F PR MOST RECENT DIASTOLIC BLOOD PRESSURE < 80 MM HG: ICD-10-PCS | Mod: CPTII,,, | Performed by: STUDENT IN AN ORGANIZED HEALTH CARE EDUCATION/TRAINING PROGRAM

## 2023-01-19 PROCEDURE — 99213 OFFICE O/P EST LOW 20 MIN: CPT | Mod: S$PBB,,, | Performed by: STUDENT IN AN ORGANIZED HEALTH CARE EDUCATION/TRAINING PROGRAM

## 2023-01-19 PROCEDURE — 3075F SYST BP GE 130 - 139MM HG: CPT | Mod: CPTII,,, | Performed by: STUDENT IN AN ORGANIZED HEALTH CARE EDUCATION/TRAINING PROGRAM

## 2023-01-19 PROCEDURE — 3008F BODY MASS INDEX DOCD: CPT | Mod: CPTII,,, | Performed by: STUDENT IN AN ORGANIZED HEALTH CARE EDUCATION/TRAINING PROGRAM

## 2023-01-19 PROCEDURE — 4010F ACE/ARB THERAPY RXD/TAKEN: CPT | Mod: CPTII,,, | Performed by: STUDENT IN AN ORGANIZED HEALTH CARE EDUCATION/TRAINING PROGRAM

## 2023-01-19 PROCEDURE — 99999 PR PBB SHADOW E&M-EST. PATIENT-LVL III: ICD-10-PCS | Mod: PBBFAC,,, | Performed by: STUDENT IN AN ORGANIZED HEALTH CARE EDUCATION/TRAINING PROGRAM

## 2023-01-19 PROCEDURE — 3078F DIAST BP <80 MM HG: CPT | Mod: CPTII,,, | Performed by: STUDENT IN AN ORGANIZED HEALTH CARE EDUCATION/TRAINING PROGRAM

## 2023-01-19 RX ORDER — VALSARTAN 80 MG/1
80 TABLET ORAL DAILY
Qty: 90 TABLET | Refills: 3 | Status: SHIPPED | OUTPATIENT
Start: 2023-01-19 | End: 2024-03-12 | Stop reason: SDUPTHER

## 2023-01-19 RX ORDER — HYDROCHLOROTHIAZIDE 12.5 MG/1
12.5 TABLET ORAL DAILY
Qty: 90 TABLET | Refills: 7 | Status: SHIPPED | OUTPATIENT
Start: 2023-01-19 | End: 2024-03-12 | Stop reason: SDUPTHER

## 2023-01-19 NOTE — PROGRESS NOTES
Clinic Note  1/19/2023      Subjective:       Patient ID:  Nolan is a 62 y.o. male being seen for an established visit.    Chief Complaint: Follow-up      Mr. Villalobos is 60 yo m w/ PMHx of Bladder Cancer s/p tumor resection and bCG tx in 2012 w/ no radiation or chemo who presents to establish care. Previously followed by Dr. Darby Vera.     History of Bladder Cancer  -stable, denies hematuria or dysuria   -Follows with Dr. Pemberton with Urology at [facility]  -annual surveillance with PSA and cystoscopy      Chronic Right knee pain: Improved after PT     HTN:  - HCTZ 12.5mg daily, valsartan 80mg daily   -BP at home 120-160s/80-90s  -denies HA/vision changes/dizzines/sycnope         Health maintenance    Colorectal Cancer Screening performed 5/22/2013. Patient's wife says that C-scope was negative.   Next C-scope due in 2023. Crow Gauthier [phone], GI who performed the colonoscopy.     Pneumococcal vaccine, shingles vaccine, COVID-19 vaccine recommended. Patient declines vaccines at this time.      Follow-up  Pertinent negatives include no abdominal pain, chest pain, chills, coughing, diaphoresis, fever, headaches, myalgias, nausea, sore throat, vomiting or weakness.     Review of Systems   Constitutional:  Negative for chills, diaphoresis, fever, malaise/fatigue and weight loss.   HENT:  Negative for sore throat.    Eyes:  Negative for blurred vision.   Respiratory:  Negative for cough and shortness of breath.    Cardiovascular:  Negative for chest pain, palpitations and leg swelling.   Gastrointestinal:  Negative for abdominal pain, constipation, diarrhea, heartburn, nausea and vomiting.   Genitourinary:  Negative for dysuria.   Musculoskeletal:  Negative for joint pain and myalgias.   Neurological:  Negative for tingling, weakness and headaches.   Endo/Heme/Allergies:  Negative for environmental allergies.   Psychiatric/Behavioral:  Negative for depression. The patient does not have insomnia.      Past Medical  "History:   Diagnosis Date    Bladder cancer        Family History   Problem Relation Age of Onset    Hypertension Mother     Cancer Mother         CRC    Hypertension Father     Cancer Brother         katie brown        reports that he has never smoked. He does not have any smokeless tobacco history on file. He reports current alcohol use of about 1.0 standard drink per week.    Medication List with Changes/Refills   Current Medications    ASCORBIC ACID, VITAMIN C, (VITAMIN C) 100 MG TABLET    Take 1,000 mg by mouth once daily.    DIPHTH,PERTUS,ACELL,,TETANUS (BOOSTRIX TDAP) 2.5-8-5 LF-MCG-LF/0.5ML SUSP    Inject 0.5 mLs into the muscle.    MULTIVITAMIN CAPSULE    Take 1 capsule by mouth once daily.   Changed and/or Refilled Medications    Modified Medication Previous Medication    HYDROCHLOROTHIAZIDE (HYDRODIURIL) 12.5 MG TAB hydroCHLOROthiazide (HYDRODIURIL) 12.5 MG Tab       Take 1 tablet (12.5 mg total) by mouth once daily.    Take 1 tablet (12.5 mg total) by mouth once daily.    VALSARTAN (DIOVAN) 80 MG TABLET valsartan (DIOVAN) 80 MG tablet       Take 1 tablet (80 mg total) by mouth once daily.    Take 1 tablet (80 mg total) by mouth once daily.     Review of patient's allergies indicates:  No Known Allergies    Patient Active Problem List   Diagnosis    Bladder cancer    Left hip pain    Chronic pain of right knee    Essential hypertension    Weakness of right leg           Objective:      /78   Pulse 76   Ht 5' 6" (1.676 m)   Wt 84.5 kg (186 lb 4.6 oz)   SpO2 97%   BMI 30.07 kg/m²   Estimated body mass index is 30.07 kg/m² as calculated from the following:    Height as of this encounter: 5' 6" (1.676 m).    Weight as of this encounter: 84.5 kg (186 lb 4.6 oz).  Physical Exam  Constitutional:       General: He is not in acute distress.     Appearance: Normal appearance. He is not diaphoretic.   HENT:      Head: Normocephalic and atraumatic.      Nose: Nose normal.      Mouth/Throat:      Mouth: Mucous " membranes are moist.   Eyes:      General: No scleral icterus.     Extraocular Movements: Extraocular movements intact.      Conjunctiva/sclera: Conjunctivae normal.   Neck:      Thyroid: No thyromegaly.   Cardiovascular:      Rate and Rhythm: Normal rate and regular rhythm.      Pulses: Normal pulses.      Heart sounds: Normal heart sounds.   Pulmonary:      Effort: Pulmonary effort is normal.      Breath sounds: Normal breath sounds. No wheezing or rales.   Abdominal:      General: Bowel sounds are normal. There is no distension.      Palpations: Abdomen is soft. There is no mass.      Tenderness: There is no abdominal tenderness.   Musculoskeletal:         General: Normal range of motion.      Cervical back: Normal range of motion.      Right lower leg: No edema.      Left lower leg: No edema.   Skin:     General: Skin is warm and dry.      Findings: No rash.   Neurological:      General: No focal deficit present.      Mental Status: He is alert and oriented to person, place, and time.   Psychiatric:         Mood and Affect: Mood and affect normal.         Behavior: Behavior normal.         Thought Content: Thought content normal.         Judgment: Judgment normal.         Assessment and Plan:   62 M with HTN. Discussed with both patient and wife importance of maintaining BP log and resting 15 min before taking BP values.       Diagnoses and all orders for this visit:    Follow up:     Essential hypertension  -     Continue valsartan 80mg daily, HCTZ 12.5mg qd  - BP log         - Limit salt intake to <2g/daily     History of Bladder cancer   -continue current therapy and surveilance with Dr. Figueredo     Follow up in about 3 months (around 4/19/2023).      Other Orders Placed This Visit:  No orders of the defined types were placed in this encounter.        Piyush Botello MD  Internal Medicine, PGY-3    Discussed with Dr. Quinones

## 2023-01-23 NOTE — PROGRESS NOTES
I have reviewed the notes, assessments, and/or procedures performed by the resident, I concur with her/his documentation of Nolan Villalobos.

## 2023-04-13 ENCOUNTER — PATIENT MESSAGE (OUTPATIENT)
Dept: INTERNAL MEDICINE | Facility: CLINIC | Age: 63
End: 2023-04-13
Payer: MEDICAID

## 2024-03-12 ENCOUNTER — OFFICE VISIT (OUTPATIENT)
Dept: INTERNAL MEDICINE | Facility: CLINIC | Age: 64
End: 2024-03-12
Payer: MEDICAID

## 2024-03-12 VITALS
BODY MASS INDEX: 29.69 KG/M2 | SYSTOLIC BLOOD PRESSURE: 127 MMHG | DIASTOLIC BLOOD PRESSURE: 80 MMHG | HEIGHT: 66 IN | OXYGEN SATURATION: 95 % | HEART RATE: 88 BPM | WEIGHT: 184.75 LBS

## 2024-03-12 DIAGNOSIS — C67.9 MALIGNANT NEOPLASM OF URINARY BLADDER, UNSPECIFIED SITE: ICD-10-CM

## 2024-03-12 DIAGNOSIS — I10 ESSENTIAL HYPERTENSION: Primary | ICD-10-CM

## 2024-03-12 PROCEDURE — 3008F BODY MASS INDEX DOCD: CPT | Mod: CPTII,,,

## 2024-03-12 PROCEDURE — 99213 OFFICE O/P EST LOW 20 MIN: CPT | Mod: S$PBB,,,

## 2024-03-12 PROCEDURE — 3079F DIAST BP 80-89 MM HG: CPT | Mod: CPTII,,,

## 2024-03-12 PROCEDURE — 99999 PR PBB SHADOW E&M-EST. PATIENT-LVL III: CPT | Mod: PBBFAC,,,

## 2024-03-12 PROCEDURE — 4010F ACE/ARB THERAPY RXD/TAKEN: CPT | Mod: CPTII,,,

## 2024-03-12 PROCEDURE — 99213 OFFICE O/P EST LOW 20 MIN: CPT | Mod: PBBFAC

## 2024-03-12 PROCEDURE — 3074F SYST BP LT 130 MM HG: CPT | Mod: CPTII,,,

## 2024-03-12 RX ORDER — VALSARTAN 80 MG/1
80 TABLET ORAL DAILY
Qty: 90 TABLET | Refills: 3 | Status: SHIPPED | OUTPATIENT
Start: 2024-03-12 | End: 2025-03-12

## 2024-03-12 RX ORDER — HYDROCHLOROTHIAZIDE 12.5 MG/1
12.5 TABLET ORAL DAILY
Qty: 90 TABLET | Refills: 3 | Status: SHIPPED | OUTPATIENT
Start: 2024-03-12

## 2024-03-12 NOTE — PROGRESS NOTES
Subjective     Chief Complaint: Medication Refills     History of Present Illness:  Mr. Nolan Villalobos is a 63 y.o. male with hx of bladder cx(2012) and HTN here for medication refills/establish care.  Patient has no complaints at this time and is out of refills his hydrochlorothiazide, and valsartan.  Blood pressure 127 systolic today.  Chart shows needing colonoscopy however shown evidence from patient regarding colonoscopy done June 21st 2023. Follows up with urology for surveillance.    Review of Systems   Constitutional:  Negative for chills, fever, malaise/fatigue and weight loss.   Respiratory:  Negative for shortness of breath and wheezing.    Cardiovascular:  Negative for chest pain and leg swelling.   Neurological: Negative.    Psychiatric/Behavioral: Negative.         PAST HISTORY:     Past Medical History:   Diagnosis Date    Bladder cancer        Past Surgical History:   Procedure Laterality Date    BLADDER SURGERY  2012    BLADDER TUMOR EXCISION         Family History   Problem Relation Age of Onset    Hypertension Mother     Cancer Mother         CRC    Hypertension Father     Cancer Brother         panc ca       Social History     Tobacco Use    Smoking status: Never   Substance Use Topics    Alcohol use: Yes     Alcohol/week: 1.0 standard drink of alcohol     Types: 1 Cans of beer per week     Comment: occ       MEDICATIONS & ALLERGIES:     Current Outpatient Medications on File Prior to Visit   Medication Sig    ascorbic acid, vitamin C, (VITAMIN C) 100 MG tablet Take 1,000 mg by mouth once daily.    diphth,pertus,acell,,tetanus (BOOSTRIX TDAP) 2.5-8-5 Lf-mcg-Lf/0.5mL Susp Inject 0.5 mLs into the muscle.    multivitamin capsule Take 1 capsule by mouth once daily.    [DISCONTINUED] hydroCHLOROthiazide (HYDRODIURIL) 12.5 MG Tab Take 1 tablet (12.5 mg total) by mouth once daily.    [DISCONTINUED] valsartan (DIOVAN) 80 MG tablet Take 1 tablet (80 mg total) by mouth once daily.     No current  "facility-administered medications on file prior to visit.       Review of patient's allergies indicates:  No Known Allergies    OBJECTIVE:     Vital Signs:  Vitals:    03/12/24 1448   BP: 127/80   BP Location: Right arm   Patient Position: Sitting   BP Method: Large (Automatic)   Pulse: 88   SpO2: 95%   Weight: 83.8 kg (184 lb 11.9 oz)   Height: 5' 6" (1.676 m)       Body mass index is 29.82 kg/m².     Physical Exam  Constitutional:       General: He is not in acute distress.     Appearance: He is not ill-appearing, toxic-appearing or diaphoretic.   HENT:      Head: Normocephalic and atraumatic.   Cardiovascular:      Rate and Rhythm: Normal rate and regular rhythm.   Pulmonary:      Effort: No respiratory distress.      Breath sounds: No wheezing.   Abdominal:      General: There is no distension.      Tenderness: There is no abdominal tenderness.   Neurological:      General: No focal deficit present.      Mental Status: He is alert and oriented to person, place, and time. Mental status is at baseline.   Psychiatric:         Mood and Affect: Mood normal.         Behavior: Behavior normal.       Health Maintenance Due   Topic Date Due    COVID-19 Vaccine (1) Never done    Shingles Vaccine (1 of 2) Never done    Pneumococcal Vaccines (Age 0-64) (2 of 2 - PPSV23 or PCV20) 01/01/2020    RSV Vaccine (Age 60+ and Pregnant patients) (1 - 1-dose 60+ series) Never done    Colorectal Cancer Screening  05/22/2023    Influenza Vaccine (1) Never done         ASSESSMENT & PLAN:   Mr. Nolan Villalobos is a 63 y.o. male here to establish care and medication refill. olonoscopy completed  June 21st, 2023. No complaints at this time.     Essential hypertension  -     hydroCHLOROthiazide (HYDRODIURIL) 12.5 MG Tab; Take 1 tablet (12.5 mg total) by mouth once daily.  Dispense: 90 tablet; Refill: 3  -     valsartan (DIOVAN) 80 MG tablet; Take 1 tablet (80 mg total) by mouth once daily.  Dispense: 90 tablet; Refill: 3    Malignant neoplasm of " urinary bladder, unspecified site        RTC in 1 year or sooner PRN     Discussed with Dr. Hawkins  - staff attestation to follow      Joselito Salomon DO, MSB   Internal Medicine, PGY-2  [facility]  [address] [place] [address] [phone]

## 2025-01-09 DIAGNOSIS — I10 ESSENTIAL HYPERTENSION: ICD-10-CM

## 2025-01-09 NOTE — TELEPHONE ENCOUNTER
----- Message from Rosa M sent at 1/9/2025  1:24 PM CST -----  Contact: [phone] patient  Requesting an RX refill or new RX.    Is this a refill or new RX:     RX name and strength hydroCHLOROthiazide (HYDRODIURIL) 12.5 MG Tab    Is this a 30 day or 90 day RX:     Pharmacy name and phone #   [id number] - [place] - [address]  [address]  [place] [address]  Phone: [phone] Fax: [phone]        The doctors have asked that we provide their patients with the following 2 reminders -- prescription refills can take up to 72 hours, and a friendly reminder that in the future you can use your [facility] account to request refills: yes

## 2025-01-10 DIAGNOSIS — I10 ESSENTIAL HYPERTENSION: Primary | ICD-10-CM

## 2025-01-10 RX ORDER — HYDROCHLOROTHIAZIDE 12.5 MG/1
12.5 TABLET ORAL DAILY
Qty: 90 TABLET | Refills: 3 | OUTPATIENT
Start: 2025-01-10

## 2025-03-10 ENCOUNTER — OFFICE VISIT (OUTPATIENT)
Dept: INTERNAL MEDICINE | Facility: CLINIC | Age: 65
End: 2025-03-10
Payer: MEDICAID

## 2025-03-10 ENCOUNTER — LAB VISIT (OUTPATIENT)
Dept: LAB | Facility: HOSPITAL | Age: 65
End: 2025-03-10
Payer: MEDICAID

## 2025-03-10 VITALS
DIASTOLIC BLOOD PRESSURE: 80 MMHG | SYSTOLIC BLOOD PRESSURE: 136 MMHG | OXYGEN SATURATION: 97 % | WEIGHT: 192 LBS | HEIGHT: 66 IN | HEART RATE: 60 BPM | BODY MASS INDEX: 30.86 KG/M2

## 2025-03-10 DIAGNOSIS — Z13.1 DIABETES MELLITUS SCREENING: ICD-10-CM

## 2025-03-10 DIAGNOSIS — Z00.00 ANNUAL PHYSICAL EXAM: ICD-10-CM

## 2025-03-10 DIAGNOSIS — I10 ESSENTIAL HYPERTENSION: ICD-10-CM

## 2025-03-10 DIAGNOSIS — I10 ESSENTIAL HYPERTENSION: Primary | ICD-10-CM

## 2025-03-10 DIAGNOSIS — Z13.220 LIPID SCREENING: ICD-10-CM

## 2025-03-10 DIAGNOSIS — M25.511 ACUTE PAIN OF RIGHT SHOULDER: ICD-10-CM

## 2025-03-10 LAB
ALBUMIN SERPL BCP-MCNC: 3.9 G/DL (ref 3.5–5.2)
ALP SERPL-CCNC: 62 U/L (ref 40–150)
ALT SERPL W/O P-5'-P-CCNC: 14 U/L (ref 10–44)
ANION GAP SERPL CALC-SCNC: 8 MMOL/L (ref 8–16)
ANION GAP SERPL CALC-SCNC: 8 MMOL/L (ref 8–16)
AST SERPL-CCNC: 46 U/L (ref 10–40)
BILIRUB SERPL-MCNC: 0.4 MG/DL (ref 0.1–1)
BUN SERPL-MCNC: 9 MG/DL (ref 8–23)
BUN SERPL-MCNC: 9 MG/DL (ref 8–23)
CALCIUM SERPL-MCNC: 9.8 MG/DL (ref 8.7–10.5)
CALCIUM SERPL-MCNC: 9.8 MG/DL (ref 8.7–10.5)
CHLORIDE SERPL-SCNC: 101 MMOL/L (ref 95–110)
CHLORIDE SERPL-SCNC: 101 MMOL/L (ref 95–110)
CHOLEST SERPL-MCNC: 195 MG/DL (ref 120–199)
CHOLEST/HDLC SERPL: 3.8 {RATIO} (ref 2–5)
CO2 SERPL-SCNC: 29 MMOL/L (ref 23–29)
CO2 SERPL-SCNC: 29 MMOL/L (ref 23–29)
CREAT SERPL-MCNC: 0.7 MG/DL (ref 0.5–1.4)
CREAT SERPL-MCNC: 0.7 MG/DL (ref 0.5–1.4)
EST. GFR  (NO RACE VARIABLE): >60 ML/MIN/1.73 M^2
EST. GFR  (NO RACE VARIABLE): >60 ML/MIN/1.73 M^2
GLUCOSE SERPL-MCNC: 87 MG/DL (ref 70–110)
GLUCOSE SERPL-MCNC: 87 MG/DL (ref 70–110)
HDLC SERPL-MCNC: 51 MG/DL (ref 40–75)
HDLC SERPL: 26.2 % (ref 20–50)
LDLC SERPL CALC-MCNC: 117.6 MG/DL (ref 63–159)
NONHDLC SERPL-MCNC: 144 MG/DL
POTASSIUM SERPL-SCNC: 4.2 MMOL/L (ref 3.5–5.1)
POTASSIUM SERPL-SCNC: 4.2 MMOL/L (ref 3.5–5.1)
PROT SERPL-MCNC: 7.6 G/DL (ref 6–8.4)
SODIUM SERPL-SCNC: 138 MMOL/L (ref 136–145)
SODIUM SERPL-SCNC: 138 MMOL/L (ref 136–145)
TRIGL SERPL-MCNC: 132 MG/DL (ref 30–150)

## 2025-03-10 PROCEDURE — 99396 PREV VISIT EST AGE 40-64: CPT | Mod: S$PBB,,,

## 2025-03-10 PROCEDURE — 99999 PR PBB SHADOW E&M-EST. PATIENT-LVL III: CPT | Mod: PBBFAC,,,

## 2025-03-10 PROCEDURE — 4010F ACE/ARB THERAPY RXD/TAKEN: CPT | Mod: CPTII,,,

## 2025-03-10 PROCEDURE — 3075F SYST BP GE 130 - 139MM HG: CPT | Mod: CPTII,,,

## 2025-03-10 PROCEDURE — 99213 OFFICE O/P EST LOW 20 MIN: CPT | Mod: PBBFAC

## 2025-03-10 PROCEDURE — 3079F DIAST BP 80-89 MM HG: CPT | Mod: CPTII,,,

## 2025-03-10 PROCEDURE — 3008F BODY MASS INDEX DOCD: CPT | Mod: CPTII,,,

## 2025-03-10 PROCEDURE — 80061 LIPID PANEL: CPT

## 2025-03-10 PROCEDURE — 83036 HEMOGLOBIN GLYCOSYLATED A1C: CPT

## 2025-03-10 PROCEDURE — 36415 COLL VENOUS BLD VENIPUNCTURE: CPT

## 2025-03-10 PROCEDURE — 80053 COMPREHEN METABOLIC PANEL: CPT

## 2025-03-10 PROCEDURE — 3044F HG A1C LEVEL LT 7.0%: CPT | Mod: CPTII,,,

## 2025-03-10 RX ORDER — HYDROCHLOROTHIAZIDE 12.5 MG/1
12.5 TABLET ORAL DAILY
Qty: 90 TABLET | Refills: 3 | Status: SHIPPED | OUTPATIENT
Start: 2025-03-10

## 2025-03-10 RX ORDER — VALSARTAN 80 MG/1
80 TABLET ORAL DAILY
Qty: 90 TABLET | Refills: 3 | Status: SHIPPED | OUTPATIENT
Start: 2025-03-10 | End: 2025-03-10 | Stop reason: SDUPTHER

## 2025-03-10 RX ORDER — VALSARTAN 80 MG/1
80 TABLET ORAL DAILY
Qty: 90 TABLET | Refills: 3 | Status: SHIPPED | OUTPATIENT
Start: 2025-03-10 | End: 2026-03-10

## 2025-03-10 RX ORDER — HYDROCHLOROTHIAZIDE 12.5 MG/1
12.5 TABLET ORAL DAILY
Qty: 90 TABLET | Refills: 3 | Status: SHIPPED | OUTPATIENT
Start: 2025-03-10 | End: 2025-03-10 | Stop reason: SDUPTHER

## 2025-03-10 RX ORDER — VALSARTAN 80 MG/1
80 TABLET ORAL DAILY
Qty: 90 TABLET | Refills: 3 | Status: SHIPPED | OUTPATIENT
Start: 2025-03-10 | End: 2025-03-10

## 2025-03-10 RX ORDER — HYDROCHLOROTHIAZIDE 12.5 MG/1
12.5 TABLET ORAL DAILY
Qty: 90 TABLET | Refills: 3 | Status: SHIPPED | OUTPATIENT
Start: 2025-03-10 | End: 2025-03-10

## 2025-03-10 NOTE — PROGRESS NOTES
Subjective     Chief Complaint: Annual Wellness visit     History of Present Illness:  Mr. Nolan Villalobos is a 64 y.o. male with hx of HTN and bladder cx here for annual visit. Taking  ARB and HCTZ here today for yearly wellness visit. Patient overall feels well with exception to 1 month hx of right shoulder pain. It does not restrict movement. He denies any injury or trauma to the area. He denies any restriction in movement. It improves  with heat and water when he is in the shower. No other complaints at this time. Overall feels well.       Review of Systems   Constitutional:  Negative for chills, diaphoresis, fever, malaise/fatigue and weight loss.   Respiratory:  Negative for shortness of breath and wheezing.    Cardiovascular:  Negative for chest pain, palpitations and leg swelling.   Musculoskeletal:  Positive for joint pain (R shoulder\) and myalgias (R shoulder).     PAST HISTORY:     Past Medical History:   Diagnosis Date    Bladder cancer        Past Surgical History:   Procedure Laterality Date    BLADDER SURGERY  2012    BLADDER TUMOR EXCISION         Family History   Problem Relation Name Age of Onset    Hypertension Mother      Cancer Mother          CRC    Hypertension Father      Cancer Brother          panc ca       Social History[1]    MEDICATIONS & ALLERGIES:     Current Outpatient Medications on File Prior to Visit   Medication Sig    ascorbic acid, vitamin C, (VITAMIN C) 100 MG tablet Take 1,000 mg by mouth once daily.    diphth,pertus,acell,,tetanus (BOOSTRIX TDAP) 2.5-8-5 Lf-mcg-Lf/0.5mL Susp Inject 0.5 mLs into the muscle.    hydroCHLOROthiazide (HYDRODIURIL) 12.5 MG Tab Take 1 tablet (12.5 mg total) by mouth once daily.    multivitamin capsule Take 1 capsule by mouth once daily.    valsartan (DIOVAN) 80 MG tablet Take 1 tablet (80 mg total) by mouth once daily.     No current facility-administered medications on file prior to visit.       Review of patient's allergies indicates:  No Known  "Allergies    OBJECTIVE:     Vital Signs:  Vitals:    03/10/25 1437   BP: 136/80   BP Location: Left arm   Patient Position: Sitting   Pulse: 60   SpO2: 97%   Weight: 87.1 kg (192 lb 0.3 oz)   Height: 5' 6" (1.676 m)       Body mass index is 30.99 kg/m².     Physical Exam  Constitutional:       General: He is not in acute distress.     Appearance: Normal appearance. He is not ill-appearing, toxic-appearing or diaphoretic.   HENT:      Head: Normocephalic and atraumatic.   Cardiovascular:      Rate and Rhythm: Normal rate and regular rhythm.      Pulses: Normal pulses.      Heart sounds: Normal heart sounds. No murmur heard.  Pulmonary:      Effort: No respiratory distress.      Breath sounds: No wheezing.   Abdominal:      General: There is no distension.      Tenderness: There is no abdominal tenderness.   Skin:     General: Skin is warm and dry.   Neurological:      Mental Status: He is alert and oriented to person, place, and time. Mental status is at baseline.   Psychiatric:         Mood and Affect: Mood normal.         Behavior: Behavior normal.     Health Maintenance Due   Topic Date Due    COVID-19 Vaccine (1) Never done    Shingles Vaccine (1 of 2) Never done    Pneumococcal Vaccines (Age 50+) (2 of 2 - PPSV23) 01/01/2020    RSV Vaccine (Age 60+ and Pregnant patients) (1 - Risk 60-74 years 1-dose series) Never done    Influenza Vaccine (1) Never done    Hemoglobin A1c (Diabetic Prevention Screening)  10/05/2024         ASSESSMENT & PLAN:   Mr. Nolan Villalobos is a 64 y.o. male here for annual visit.       Plan:   - Refilled Home BP regimen. Advised to start BP log again   - Advised heating pad and other conservative measures for right shoulder strain/injury/tight muscle. Will consider PT if unrelieved or  worsening   - repeat colon cx screen 10 years last one June 2023   - New labs, CMP, lipid panel, and A1c   - F/u with urology later this year        Essential hypertension    Diabetes mellitus screening    Acute " pain of right shoulder        RTC in 1 year or sooner PRN    Discussed with Dr. Mccloud  - staff attestation to follow      Joselito Salomon DO, MSB   Internal Medicine, PGY-3  [facility]  [address], [place] [id number] [phone]         [1]   Social History  Tobacco Use    Smoking status: Never    Smokeless tobacco: Never   Substance Use Topics    Alcohol use: Yes     Alcohol/week: 1.0 standard drink of alcohol     Types: 1 Cans of beer per week     Comment: occ

## 2025-03-11 ENCOUNTER — RESULTS FOLLOW-UP (OUTPATIENT)
Dept: INTERNAL MEDICINE | Facility: CLINIC | Age: 65
End: 2025-03-11

## 2025-03-11 LAB
ESTIMATED AVG GLUCOSE: 100 MG/DL (ref 68–131)
HBA1C MFR BLD: 5.1 % (ref 4–5.6)

## 2025-03-12 NOTE — PROGRESS NOTES
I have personally discussed  this patient and agree with the resident, and agree with  their note as stated above with the following thoughts:  As per resident's note.  Continue the valsartan 80 mg a day and hydrochlorothiazide 12.5.  Discussed appropriate diet and exercise with a weight loss goal to get BMI under 30.   MD Chun

## 2025-03-18 ENCOUNTER — PATIENT MESSAGE (OUTPATIENT)
Dept: INTERNAL MEDICINE | Facility: CLINIC | Age: 65
End: 2025-03-18
Payer: MEDICAID

## 2025-04-14 ENCOUNTER — PATIENT MESSAGE (OUTPATIENT)
Dept: INTERNAL MEDICINE | Facility: CLINIC | Age: 65
End: 2025-04-14
Payer: MEDICAID